# Patient Record
Sex: MALE | Race: WHITE | NOT HISPANIC OR LATINO | Employment: OTHER | URBAN - METROPOLITAN AREA
[De-identification: names, ages, dates, MRNs, and addresses within clinical notes are randomized per-mention and may not be internally consistent; named-entity substitution may affect disease eponyms.]

---

## 2020-01-07 ENCOUNTER — TELEPHONE (OUTPATIENT)
Dept: SURGERY | Facility: CLINIC | Age: 65
End: 2020-01-07

## 2020-01-07 NOTE — TELEPHONE ENCOUNTER
----- Message from Pushpa Chaudhary RN sent at 1/6/2020  2:18 PM CST -----  Contact: Jeanna ([facility]): [phone]  Did you receive the disc?    Pushpa  ----- Message -----  From: Олег Zhao  Sent: 1/6/2020   2:07 PM CST  To: Kamar [facility] Staff    State the pt's disc were sent over on 1/2 and the office should have gotten them on the next day       Please contact Jeanna ([facility]): [phone]

## 2020-01-08 ENCOUNTER — TELEPHONE (OUTPATIENT)
Dept: SURGERY | Facility: CLINIC | Age: 65
End: 2020-01-08

## 2020-01-08 ENCOUNTER — OFFICE VISIT (OUTPATIENT)
Dept: SURGERY | Facility: CLINIC | Age: 65
End: 2020-01-08
Payer: MEDICARE

## 2020-01-08 VITALS
DIASTOLIC BLOOD PRESSURE: 86 MMHG | HEIGHT: 71 IN | SYSTOLIC BLOOD PRESSURE: 148 MMHG | WEIGHT: 217.38 LBS | BODY MASS INDEX: 30.43 KG/M2 | HEART RATE: 80 BPM

## 2020-01-08 DIAGNOSIS — C20 RECTAL CANCER: Primary | ICD-10-CM

## 2020-01-08 PROCEDURE — 3008F PR BODY MASS INDEX (BMI) DOCUMENTED: ICD-10-PCS | Mod: CPTII,S$GLB,, | Performed by: COLON & RECTAL SURGERY

## 2020-01-08 PROCEDURE — 99204 OFFICE O/P NEW MOD 45 MIN: CPT | Mod: S$GLB,,, | Performed by: COLON & RECTAL SURGERY

## 2020-01-08 PROCEDURE — 1101F PT FALLS ASSESS-DOCD LE1/YR: CPT | Mod: CPTII,S$GLB,, | Performed by: COLON & RECTAL SURGERY

## 2020-01-08 PROCEDURE — 99204 PR OFFICE/OUTPT VISIT, NEW, LEVL IV, 45-59 MIN: ICD-10-PCS | Mod: S$GLB,,, | Performed by: COLON & RECTAL SURGERY

## 2020-01-08 PROCEDURE — 99999 PR PBB SHADOW E&M-EST. PATIENT-LVL III: ICD-10-PCS | Mod: PBBFAC,,, | Performed by: COLON & RECTAL SURGERY

## 2020-01-08 PROCEDURE — 3008F BODY MASS INDEX DOCD: CPT | Mod: CPTII,S$GLB,, | Performed by: COLON & RECTAL SURGERY

## 2020-01-08 PROCEDURE — 99999 PR PBB SHADOW E&M-EST. PATIENT-LVL III: CPT | Mod: PBBFAC,,, | Performed by: COLON & RECTAL SURGERY

## 2020-01-08 PROCEDURE — 1101F PR PT FALLS ASSESS DOC 0-1 FALLS W/OUT INJ PAST YR: ICD-10-PCS | Mod: CPTII,S$GLB,, | Performed by: COLON & RECTAL SURGERY

## 2020-01-08 RX ORDER — LISINOPRIL AND HYDROCHLOROTHIAZIDE 12.5; 2 MG/1; MG/1
1 TABLET ORAL DAILY
Status: ON HOLD | COMMUNITY
End: 2020-09-07 | Stop reason: HOSPADM

## 2020-01-08 RX ORDER — ALLOPURINOL 100 MG/1
100 TABLET ORAL 2 TIMES DAILY
COMMUNITY

## 2020-01-08 RX ORDER — METFORMIN HYDROCHLORIDE 500 MG/1
500 TABLET ORAL 2 TIMES DAILY WITH MEALS
COMMUNITY

## 2020-01-08 NOTE — LETTER
January 23, 2020      JANNETTE Viramontes MD  [address]  [place] [address]           [facility]  [address]  [facility] [address]  Phone: [phone]          Patient: Franky Peraza   MR Number: [id number]   YOB: 1955   Date of Visit: 1/8/2020       Dear Dr. JANNETTE Viramontes:    Thank you for referring Franky Peraza to me for evaluation. Attached you will find relevant portions of my assessment and plan of care.    If you have questions, please do not hesitate to call me. I look forward to following Franky Peraza along with you.    Sincerely,    ENMANUEL Galvin MD    Enclosure  CC:  No Recipients    If you would like to receive this communication electronically, please contact [email or web address] or [phone] to request more information on [employer] Link access.    For providers and/or their staff who would like to refer a patient to [facility], please contact us through our one-stop-shop provider referral line, [facility], at [phone].    If you feel you have received this communication in error or would no longer like to receive these types of communications, please e-mail [email or web address]

## 2020-01-08 NOTE — TELEPHONE ENCOUNTER
Called [facility] in [place] about overnight fedexing slides. Sent the FRANCK form and gave her our Fed Ex # to [place].

## 2020-01-08 NOTE — PROGRESS NOTES
[facility]  Colorectal Surgery  Progress Note        Subjective:     Mr. Peraza is a 64 y.o. male from [place], [place] who presents to the clinic with newly diagnosed rectal cancer. He reports that about 6 months ago, he started to have some bleeding per rectum associated with bowel movements. At the time, he was hoping this was due to hemorrhoids although he has not had hemorrhoids in the past. Additionally at that time, he was unable to get a coloscopy as he did not have insurance. Once he turned 64, he was referred to GI for rectal bleeding and underwent colonoscopy on 12/2/2019 which showed a low rectal mass with biopsy returning as moderately differentiated rectal adenocarcinoma. He had an MRI w/ and w/o contrast performed 12/26/2019 which did not show any definitive evidence of extension of the tumor through the muscular wall, but did show two lymph nodes in the mesial rectal fat. PET completed 12/26/2019 did not show any evidence of metastatic disease. He currently reports only rectal bleeding. No rectal pain, no changes in stool caliber, frequency, or quality. He has 1-2 non-painful, soft, well formed bowel movements daily.     He has since seen a hematologist/oncologist Dr. Krystle Staples and a radiation oncologist Dr. Viramontes. He has also seen Dr. Monty Dowd, a general surgeon, who recommended APR. However, due to distal nature of the tumor, did not feel that he would be a candidate for colostomy reversal. Mr. Peraza wanted to a second opinion from a colorectal surgeon regarding surgical options and so presented here.     He is in otherwise good health and works on a farm with machinery. He has a history of gout, diabetes and hypertension which are all well controlled with medication. No history of abdominal surgeries.     Last colonoscopy: 12/2/2019 - Rectal mass with pathology of rectal carcinoma  Family history of CRC: two maternal uncles with rectal cancer diagnosed in 60 and  70s.  Family history of IBD: denies     ROS:  Gen: no fevers, no chills, +intentional 20 lb weight loss over last 6 months  CV: no palpitations, no peripheral edema  Respit: no cough, no SOB  Abd: no abd pain, no nausea, no vomiting. +rectal bleeding.   Skin: no rash, no wound  MSK: no back pain, no myalgias, +arthralgias associated with arthritis  Heme: no lymphadenopathy  Neuro: no headache, no dizziness      Current Outpatient Medications:     allopurinol (ZYLOPRIM) 100 MG tablet, Take 100 mg by mouth once daily., Disp: , Rfl:     lisinopril-hydrochlorothiazide (PRINZIDE,ZESTORETIC) 20-12.5 mg per tablet, Take 1 tablet by mouth once daily., Disp: , Rfl:     metFORMIN (GLUCOPHAGE) 500 MG tablet, Take 500 mg by mouth 2 (two) times daily with meals., Disp: , Rfl:        Objective:      Vitals:   Vitals:    01/08/20 0843   BP: (!) 148/86   Pulse: 80     Physical Exam:  Gen: well appearing, no acute distress  CV: RRR  Respit: Breathing non-labored  Abd: Soft, non-tender, non-distended  Rectal: ADELAIDA with palpable mass approximately 4 cm from anal verge. Mostly left lateral and extending anterior and encompasses about 40% of the circumference. Blood noted in anal canal. Flex sig evaluating the rectum was completed demonstrating a bleeding distal rectal mass extending down to the dentate line.  Extr: Warm and well perfused  MSK: moves all extremities appropriately  Neuro: alert, CN II - XII grossly intact     Significant Diagnostics:  OSH imaging and labs reviewed:    12/26/2019 MRI Pelvis w/ and w/o contrast  - Eccentric wall thickening in rectum predominantly anteriorly along left later aspect with enhancement likely representing described neoplasm. No definitive evidence of extension through wall. 2 nodes in mesial rectal fat with largest 7-8 mm.    12/26/2019 PET CT   - No evidence of metastatic disease    12/24/2019 Labs  - H/H 14.1/41.2  - CEA 27.3       Assessment/Plan:      Mr. Peraza is a 63 yo M with locally  advanced low rectal cancer with excellent functional status, highly motivated for sphincter preserving surgery.  We discussed that given the very distal location of the tumor into the anal canal, a sphincter preserving surgery at this point in time would likely not be technically feasible.  However, I discussed with him the potential for sphincter preserving surgery after neoadjuvant therapy.   I would favor total neoadjuvant therapy utilizing long course chemoradiotherapy followed by 8 cycles of chemotherapy.  I would perform restaging flex sig and MRI 1 month following completion of CXRT and as long as no progression would proceed with full chemotherapy (FOLFOX) 8 cycles then reassessment and either LAR Intersphincteric resection with colonic J pouch anal anastomosis versus APR.       This plan will be relayed to Dr. Viramontes and Dr. Staples  Will have the patient follow up in 3 months for re-staging       Odin TSE MD, FACS, FASCRS  Staff Surgeon  Dept of Colon and Rectal Surgery  [facility] [place], [place]    Procedure note    Flexible sigmoidoscopy    Verbal consent obtained.     Indications:  Rectal cancer    Post procedure diagnosis:  Same, low    Procedure:  Flexible sigmoidoscopy    Surgeon KASHIF    Asst:  Yvonne    Findings:    Tumor below the anorectal ring just above the dentate line                        Technique in detail:  Timeout performed.  Pt placed in left lateral Hardin position.  Lubrication with digital rectal exam revealed nl tone,  palpable masses.  The endoscope was lubricated and the tip inserted into the anal canal.  The endoscope was advanced under direct vision to 35  cm.  Upon withdrawal the mucosa was meticulously inspected.  The pt tolerated the procedure well     Complications:  None    EBL:  None    Patient discharged from the office in stable condition

## 2020-01-09 ENCOUNTER — TELEPHONE (OUTPATIENT)
Dept: SURGERY | Facility: CLINIC | Age: 65
End: 2020-01-09

## 2020-01-09 NOTE — TELEPHONE ENCOUNTER
Spoke with Clarisse at [facility], [place], visit note from 1/8 was faxed, stated it has been recieved

## 2020-01-09 NOTE — TELEPHONE ENCOUNTER
----- Message from Colleen Burkett sent at 1/9/2020  8:21 AM CST -----  Contact: [facility]  Reason: Calling to get visit notes form yesterday's visit. Pt is being seen @ 1:00 today and notes is needed for visit. Thanks    Communication: tel [phone] fax [phone]

## 2020-01-16 ENCOUNTER — TELEPHONE (OUTPATIENT)
Dept: SURGERY | Facility: CLINIC | Age: 65
End: 2020-01-16

## 2020-01-16 NOTE — TELEPHONE ENCOUNTER
Spoke with patient. States he spoke with Dr. Santillan this afternoon and a defined plan of care was established. He is to have a port implanted on Monday 1/20 and start chemotherapy Tuesday 1/21.

## 2020-01-16 NOTE — TELEPHONE ENCOUNTER
----- Message from ENMANUEL Galvin MD sent at 1/16/2020  4:43 PM CST -----  Contact: pt: [phone]  I spoke with Dr Staples.  Can you follow up with pt to make sure she has contacted him?   Thanks.  Call me if she has not.  DV    [phone]  ----- Message -----  From: Krystle Walsh RN  Sent: 1/14/2020   4:52 PM CST  To: ENMANUEL Galvin MD        ----- Message -----  From: Олег Zhao  Sent: 1/14/2020   2:06 PM CST  To: [facility] Staff    Pt called to speak with nurse, state his chemo doctor Dr. Krystle Staples is still waiting to hear from Dr. Galvin so that he can start his treatment, state he has not heard anything from anyone re the matter     Please contact pt: [phone]      Can contact Dr. Staples office at [phone]

## 2020-01-22 PROBLEM — C20 RECTAL CANCER: Status: ACTIVE | Noted: 2020-01-22

## 2020-01-23 ENCOUNTER — DOCUMENTATION ONLY (OUTPATIENT)
Dept: SURGERY | Facility: HOSPITAL | Age: 65
End: 2020-01-23

## 2020-01-23 NOTE — PROGRESS NOTES
Multidisciplinary Rectal Cancer Conference - Evaluation and Recommendation Summary    1/22/2020  Franky Peraza  [id number]  64 y.o. male    1. Evaluation    63 yo M     6 months of BRBPR    Colonoscopy 12/2/19: Low rectal mass     Path: moderately differentiated  Adenocarcinoma.         MRI date: 12/26/2020    4-5 cm from anal verge. No obvious extension through muslce wall. Two 7 mm suspicious LN. No sphincter involvement.     Tumor Location in Rectum: lower third    Indication of Sphincter Involvement:  Uninvolved    Pretreatment Circumferential Resection Margin (CRM) status:  Not Threatened    Pretreatment (clinical) AJCC Stage: IIIA  Stage I   [] I: T1N0M0  [] I: T2N0M0  Stage II  [] IIA: T3N0M0  [] IIB T8zI7O9  [] IIC: M5sR1G1  Stage III  [x] IIIA: T1-2N1M0  [] IIIA: P1B2tI2  [] IIIB: T3-R6dX5Q3  [] IIIB: T2-3N2aM0  [] IIIB: T1-2N2bM0  [] IIIC: S0lI1gQ7  [] IIIC: T3-3yI0hG2  [] IIIC: N5bM8-4U8   Stage IV  [] IV: J1-1R3-4R1k-b    CEA level: 27.3    2. Treatment Recommendation    Neoadjuvant Therapy Recommendation:     Long Chemoradiation + Chemotherapy (TIFFANI) and likely APR after.     Anticipated date and type of surgical procedure: - Abdominoperineal Resection (APR)     Clinical research study eligibility and/or enrollment: Not eligible

## 2020-03-31 ENCOUNTER — TELEPHONE (OUTPATIENT)
Dept: SURGERY | Facility: CLINIC | Age: 65
End: 2020-03-31

## 2020-03-31 NOTE — TELEPHONE ENCOUNTER
Spoke to pt informing him that his OV is being postponed for 3 mo's per Dr. Galvin, due to COVID19 concern. Pt voiced understanding.

## 2020-07-08 ENCOUNTER — OFFICE VISIT (OUTPATIENT)
Dept: SURGERY | Facility: CLINIC | Age: 65
End: 2020-07-08
Payer: MEDICARE

## 2020-07-08 VITALS
BODY MASS INDEX: 31.17 KG/M2 | WEIGHT: 222.63 LBS | HEART RATE: 80 BPM | HEIGHT: 71 IN | SYSTOLIC BLOOD PRESSURE: 120 MMHG | DIASTOLIC BLOOD PRESSURE: 80 MMHG

## 2020-07-08 DIAGNOSIS — C20 RECTAL CANCER: Primary | ICD-10-CM

## 2020-07-08 PROCEDURE — 1101F PR PT FALLS ASSESS DOC 0-1 FALLS W/OUT INJ PAST YR: ICD-10-PCS | Mod: CPTII,S$GLB,, | Performed by: COLON & RECTAL SURGERY

## 2020-07-08 PROCEDURE — 99999 PR PBB SHADOW E&M-EST. PATIENT-LVL III: CPT | Mod: PBBFAC,,, | Performed by: COLON & RECTAL SURGERY

## 2020-07-08 PROCEDURE — 3008F BODY MASS INDEX DOCD: CPT | Mod: CPTII,S$GLB,, | Performed by: COLON & RECTAL SURGERY

## 2020-07-08 PROCEDURE — 1101F PT FALLS ASSESS-DOCD LE1/YR: CPT | Mod: CPTII,S$GLB,, | Performed by: COLON & RECTAL SURGERY

## 2020-07-08 PROCEDURE — 99213 PR OFFICE/OUTPT VISIT, EST, LEVL III, 20-29 MIN: ICD-10-PCS | Mod: S$GLB,,, | Performed by: COLON & RECTAL SURGERY

## 2020-07-08 PROCEDURE — 3008F PR BODY MASS INDEX (BMI) DOCUMENTED: ICD-10-PCS | Mod: CPTII,S$GLB,, | Performed by: COLON & RECTAL SURGERY

## 2020-07-08 PROCEDURE — 99999 PR PBB SHADOW E&M-EST. PATIENT-LVL III: ICD-10-PCS | Mod: PBBFAC,,, | Performed by: COLON & RECTAL SURGERY

## 2020-07-08 PROCEDURE — 99213 OFFICE O/P EST LOW 20 MIN: CPT | Mod: S$GLB,,, | Performed by: COLON & RECTAL SURGERY

## 2020-07-08 NOTE — PROGRESS NOTES
HPI:  Franky Peraza is a 64 y.o. male with history of rectal cancer.  He has 2 more cycles of chemotherapy to completed TIFFANI.  He feels well.  He denies rectal bleeding.  He is moving his bowels well.      Multidisciplinary Rectal Cancer Conference - Evaluation and Recommendation Summary     1/22/2020  Franky Peraza  [id number]  64 y.o. male     1. Evaluation     63 yo M      6 months of BRBPR     Colonoscopy 12/2/19: Low rectal mass      Path: moderately differentiated  Adenocarcinoma.            MRI date: 12/26/2020     4-5 cm from anal verge. No obvious extension through muslce wall. Two 7 mm suspicious LN. No sphincter involvement.      Tumor Location in Rectum: lower third     Indication of Sphincter Involvement:  Uninvolved     Pretreatment Circumferential Resection Margin (CRM) status:  Not Threatened     Pretreatment (clinical) AJCC Stage: IIIA  Stage I   []? I: T1N0M0  []? I: T2N0M0  Stage II  []? IIA: T3N0M0  []? IIB A6eE7W0  []? IIC: B1lR5H0  Stage III  [x]? IIIA: T1-2N1M0  []? IIIA: R0O1hG0  []? IIIB: T3-I1hE6T7  []? IIIB: T2-3N2aM0  []? IIIB: T1-2N2bM0  []? IIIC: S1uU1kG6  []? IIIC: T3-1kT1jT5  []? IIIC: V9tX7-5V7   Stage IV  []? IV: D4-5O7-6E2i-b     CEA level: 27.3     2. Treatment Recommendation     Neoadjuvant Therapy Recommendation:      Long Chemoradiation + Chemotherapy (TIFFANI) and likely APR after.      Anticipated date and type of surgical procedure: - Abdominoperineal Resection (APR)      Clinical research study eligibility and/or enrollment: Not eligible         Interval history  2 more cycles of chemo.  Will finish July 28th.  No bleeding.  BMs generally formed.   Appetite and energy levels good.         No past medical history on file.     No past surgical history on file.    Review of patient's allergies indicates:  No Known Allergies    No family history on file.    Social History     Socioeconomic History    Marital status:      Spouse name: Not on file    Number of children: Not on file  "   Years of education: Not on file    Highest education level: Not on file   Occupational History    Not on file   Social Needs    Financial resource strain: Not on file    Food insecurity     Worry: Not on file     Inability: Not on file    Transportation needs     Medical: Not on file     Non-medical: Not on file   Tobacco Use    Smoking status: Never Smoker    Smokeless tobacco: Never Used   Substance and Sexual Activity    Alcohol use: Never     Frequency: Never    Drug use: Never    Sexual activity: Yes   Lifestyle    Physical activity     Days per week: Not on file     Minutes per session: Not on file    Stress: Not on file   Relationships    Social connections     Talks on phone: Not on file     Gets together: Not on file     Attends Rastafari service: Not on file     Active member of club or organization: Not on file     Attends meetings of clubs or organizations: Not on file     Relationship status: Not on file   Other Topics Concern    Not on file   Social History Narrative    Not on file       ROS:  GENERAL: No fever, chills, fatigability or weight loss.  Integument: No rashes, redness, icterus  CHEST: Denies MCGARRY, cyanosis, wheezing, cough and sputum production.  CARDIOVASCULAR: Denies chest pain, PND, orthopnea or reduced exercise tolerance.  GI: Denies abd pain, dysphagia, nausea, vomiting, no hematemesis   : Denies burning on urination, no hematuria, no bacteriuria  MSK: No deformities, swelling, joint pain swelling  Neurologic: No HAs, seizures, weakness, paresthesias, gait problems    PE:  General appearance healthy male in NAD   /80 (BP Location: Right arm, Patient Position: Sitting, BP Method: Large (Automatic))   Pulse 80   Ht 5' 11" (1.803 m)   Wt 101 kg (222 lb 9.6 oz)   BMI 31.05 kg/m²   Sclera/ Skin anicteric  LN none palpable  AT NC EOMI  Neck supple trachea midline   Chest symmetric, nl excursion, no retractions, breathing comfortably  Abdomen  ND soft NT.  No " masses, no organomegaly  EXT - no CCE  Neuro:  Mood/ affect nl, alert and oriented x 3, moves all ext's, gait nl    Assessment:  Complete chemotherapy    Plan:  Flex sig  MRI

## 2020-08-03 ENCOUNTER — TELEPHONE (OUTPATIENT)
Dept: SURGERY | Facility: CLINIC | Age: 65
End: 2020-08-03

## 2020-08-03 NOTE — TELEPHONE ENCOUNTER
----- Message from Nathalie Castellon sent at 8/3/2020  4:16 PM CDT -----  Franky Peraza called stated his authorization for MRI need to be redone.  His insurance stated he need the MRI done on or before 8/7/20   [phone]

## 2020-08-10 ENCOUNTER — HOSPITAL ENCOUNTER (OUTPATIENT)
Dept: RADIOLOGY | Facility: HOSPITAL | Age: 65
Discharge: HOME | End: 2020-08-10
Attending: COLON & RECTAL SURGERY
Payer: MEDICARE

## 2020-08-10 DIAGNOSIS — C20 RECTAL CANCER: ICD-10-CM

## 2020-08-10 PROCEDURE — A9585 GADOBUTROL INJECTION: HCPCS | Performed by: COLON & RECTAL SURGERY

## 2020-08-10 PROCEDURE — 25500020 PHARM REV CODE 255: Performed by: COLON & RECTAL SURGERY

## 2020-08-10 PROCEDURE — 72197 MRI PELVIS W/O & W/DYE: CPT | Mod: 26,,, | Performed by: RADIOLOGY

## 2020-08-10 PROCEDURE — 72197 MRI PELVIS W/O & W/DYE: CPT | Mod: TC

## 2020-08-10 PROCEDURE — 72197 MRI RECTAL CANCER W W/O CONTRAST: ICD-10-PCS | Mod: 26,,, | Performed by: RADIOLOGY

## 2020-08-10 RX ORDER — GADOBUTROL 604.72 MG/ML
10 INJECTION INTRAVENOUS
Status: COMPLETED | OUTPATIENT
Start: 2020-08-10 | End: 2020-08-10

## 2020-08-10 RX ADMIN — GADOBUTROL 10 ML: 604.72 INJECTION INTRAVENOUS at 02:08

## 2020-08-11 ENCOUNTER — OFFICE VISIT (OUTPATIENT)
Dept: SURGERY | Facility: CLINIC | Age: 65
End: 2020-08-11
Payer: MEDICARE

## 2020-08-11 ENCOUNTER — OFFICE VISIT (OUTPATIENT)
Dept: WOUND CARE | Facility: CLINIC | Age: 65
End: 2020-08-11
Payer: MEDICARE

## 2020-08-11 ENCOUNTER — ANESTHESIA (OUTPATIENT)
Dept: ENDOSCOPY | Facility: HOSPITAL | Age: 65
End: 2020-08-11
Payer: MEDICARE

## 2020-08-11 ENCOUNTER — ANESTHESIA EVENT (OUTPATIENT)
Dept: ENDOSCOPY | Facility: HOSPITAL | Age: 65
End: 2020-08-11
Payer: MEDICARE

## 2020-08-11 ENCOUNTER — HOSPITAL ENCOUNTER (OUTPATIENT)
Dept: CARDIOLOGY | Facility: CLINIC | Age: 65
Discharge: HOME | End: 2020-08-11
Payer: MEDICARE

## 2020-08-11 ENCOUNTER — HOSPITAL ENCOUNTER (OUTPATIENT)
Facility: HOSPITAL | Age: 65
Discharge: HOME | End: 2020-08-11
Attending: COLON & RECTAL SURGERY | Admitting: COLON & RECTAL SURGERY
Payer: MEDICARE

## 2020-08-11 VITALS
BODY MASS INDEX: 30.13 KG/M2 | HEART RATE: 83 BPM | WEIGHT: 215.19 LBS | SYSTOLIC BLOOD PRESSURE: 192 MMHG | HEIGHT: 71 IN | DIASTOLIC BLOOD PRESSURE: 110 MMHG

## 2020-08-11 VITALS
WEIGHT: 212 LBS | OXYGEN SATURATION: 100 % | RESPIRATION RATE: 16 BRPM | SYSTOLIC BLOOD PRESSURE: 146 MMHG | HEIGHT: 71 IN | BODY MASS INDEX: 29.68 KG/M2 | HEART RATE: 81 BPM | DIASTOLIC BLOOD PRESSURE: 90 MMHG | TEMPERATURE: 98 F

## 2020-08-11 DIAGNOSIS — C20 RECTAL CANCER: ICD-10-CM

## 2020-08-11 DIAGNOSIS — Z01.818 PRE-OP EVALUATION: ICD-10-CM

## 2020-08-11 DIAGNOSIS — Z43.2 ATTENTION TO ILEOSTOMY: Primary | ICD-10-CM

## 2020-08-11 DIAGNOSIS — C20 RECTAL CANCER: Primary | ICD-10-CM

## 2020-08-11 DIAGNOSIS — Z71.89 ENCOUNTER FOR OSTOMY CARE EDUCATION: ICD-10-CM

## 2020-08-11 DIAGNOSIS — Z01.818 PRE-OP EVALUATION: Primary | ICD-10-CM

## 2020-08-11 LAB
CREAT SERPL-MCNC: 1 MG/DL (ref 0.5–1.4)
POCT GLUCOSE: 119 MG/DL (ref 70–110)
SAMPLE: NORMAL
SARS-COV-2 RDRP RESP QL NAA+PROBE: NEGATIVE

## 2020-08-11 PROCEDURE — E9220 PRA ENDO ANESTHESIA: ICD-10-PCS | Mod: ,,, | Performed by: NURSE ANESTHETIST, CERTIFIED REGISTERED

## 2020-08-11 PROCEDURE — 63600175 PHARM REV CODE 636 W HCPCS: Performed by: NURSE ANESTHETIST, CERTIFIED REGISTERED

## 2020-08-11 PROCEDURE — 99024 PR POST-OP FOLLOW-UP VISIT: ICD-10-PCS | Mod: S$GLB,,, | Performed by: CLINICAL NURSE SPECIALIST

## 2020-08-11 PROCEDURE — 93010 ELECTROCARDIOGRAM REPORT: CPT | Mod: S$GLB,,, | Performed by: INTERNAL MEDICINE

## 2020-08-11 PROCEDURE — 99214 PR OFFICE/OUTPT VISIT, EST, LEVL IV, 30-39 MIN: ICD-10-PCS | Mod: 25,S$GLB,, | Performed by: COLON & RECTAL SURGERY

## 2020-08-11 PROCEDURE — 99999 PR PBB SHADOW E&M-EST. PATIENT-LVL II: CPT | Mod: PBBFAC,,, | Performed by: CLINICAL NURSE SPECIALIST

## 2020-08-11 PROCEDURE — 45330 DIAGNOSTIC SIGMOIDOSCOPY: CPT | Mod: ,,, | Performed by: COLON & RECTAL SURGERY

## 2020-08-11 PROCEDURE — 93005 ELECTROCARDIOGRAM TRACING: CPT | Mod: 59,S$GLB,, | Performed by: COLON & RECTAL SURGERY

## 2020-08-11 PROCEDURE — 3008F PR BODY MASS INDEX (BMI) DOCUMENTED: ICD-10-PCS | Mod: CPTII,S$GLB,, | Performed by: COLON & RECTAL SURGERY

## 2020-08-11 PROCEDURE — E9220 PRA ENDO ANESTHESIA: HCPCS | Mod: ,,, | Performed by: NURSE ANESTHETIST, CERTIFIED REGISTERED

## 2020-08-11 PROCEDURE — U0002 COVID-19 LAB TEST NON-CDC: HCPCS

## 2020-08-11 PROCEDURE — 99999 PR PBB SHADOW E&M-EST. PATIENT-LVL III: CPT | Mod: PBBFAC,,, | Performed by: COLON & RECTAL SURGERY

## 2020-08-11 PROCEDURE — 93010 EKG 12-LEAD: ICD-10-PCS | Mod: S$GLB,,, | Performed by: INTERNAL MEDICINE

## 2020-08-11 PROCEDURE — 37000008 HC ANESTHESIA 1ST 15 MINUTES: Performed by: COLON & RECTAL SURGERY

## 2020-08-11 PROCEDURE — 1101F PT FALLS ASSESS-DOCD LE1/YR: CPT | Mod: CPTII,S$GLB,, | Performed by: COLON & RECTAL SURGERY

## 2020-08-11 PROCEDURE — 25000003 PHARM REV CODE 250: Performed by: COLON & RECTAL SURGERY

## 2020-08-11 PROCEDURE — 3008F BODY MASS INDEX DOCD: CPT | Mod: CPTII,S$GLB,, | Performed by: COLON & RECTAL SURGERY

## 2020-08-11 PROCEDURE — 93005 EKG 12-LEAD: ICD-10-PCS | Mod: 59,S$GLB,, | Performed by: COLON & RECTAL SURGERY

## 2020-08-11 PROCEDURE — 99214 OFFICE O/P EST MOD 30 MIN: CPT | Mod: 25,S$GLB,, | Performed by: COLON & RECTAL SURGERY

## 2020-08-11 PROCEDURE — 99999 PR PBB SHADOW E&M-EST. PATIENT-LVL II: ICD-10-PCS | Mod: PBBFAC,,, | Performed by: CLINICAL NURSE SPECIALIST

## 2020-08-11 PROCEDURE — 99999 PR PBB SHADOW E&M-EST. PATIENT-LVL III: ICD-10-PCS | Mod: PBBFAC,,, | Performed by: COLON & RECTAL SURGERY

## 2020-08-11 PROCEDURE — 37000009 HC ANESTHESIA EA ADD 15 MINS: Performed by: COLON & RECTAL SURGERY

## 2020-08-11 PROCEDURE — 45330 DIAGNOSTIC SIGMOIDOSCOPY: CPT | Performed by: COLON & RECTAL SURGERY

## 2020-08-11 PROCEDURE — 1101F PR PT FALLS ASSESS DOC 0-1 FALLS W/OUT INJ PAST YR: ICD-10-PCS | Mod: CPTII,S$GLB,, | Performed by: COLON & RECTAL SURGERY

## 2020-08-11 PROCEDURE — 45330 PR SIGMOIDOSCOPY,DIAG2STIC: ICD-10-PCS | Mod: ,,, | Performed by: COLON & RECTAL SURGERY

## 2020-08-11 PROCEDURE — 99024 POSTOP FOLLOW-UP VISIT: CPT | Mod: S$GLB,,, | Performed by: CLINICAL NURSE SPECIALIST

## 2020-08-11 RX ORDER — NEOMYCIN SULFATE 500 MG/1
TABLET ORAL
Qty: 6 TABLET | Refills: 0 | Status: ON HOLD | OUTPATIENT
Start: 2020-08-11 | End: 2020-09-07 | Stop reason: HOSPADM

## 2020-08-11 RX ORDER — SODIUM CHLORIDE 9 MG/ML
INJECTION, SOLUTION INTRAVENOUS CONTINUOUS
Status: DISCONTINUED | OUTPATIENT
Start: 2020-08-11 | End: 2020-08-11 | Stop reason: HOSPADM

## 2020-08-11 RX ORDER — PROPOFOL 10 MG/ML
VIAL (ML) INTRAVENOUS
Status: DISCONTINUED | OUTPATIENT
Start: 2020-08-11 | End: 2020-08-11

## 2020-08-11 RX ORDER — PROPOFOL 10 MG/ML
VIAL (ML) INTRAVENOUS CONTINUOUS PRN
Status: DISCONTINUED | OUTPATIENT
Start: 2020-08-11 | End: 2020-08-11

## 2020-08-11 RX ORDER — POLYETHYLENE GLYCOL 3350 17 G/17G
POWDER, FOR SOLUTION ORAL
Qty: 238 G | Refills: 0 | Status: ON HOLD | OUTPATIENT
Start: 2020-08-11 | End: 2020-09-07 | Stop reason: HOSPADM

## 2020-08-11 RX ORDER — METRONIDAZOLE 500 MG/1
TABLET ORAL
Qty: 3 TABLET | Refills: 0 | Status: ON HOLD | OUTPATIENT
Start: 2020-08-11 | End: 2020-09-07 | Stop reason: HOSPADM

## 2020-08-11 RX ORDER — LIDOCAINE HYDROCHLORIDE 20 MG/ML
INJECTION INTRAVENOUS
Status: DISCONTINUED | OUTPATIENT
Start: 2020-08-11 | End: 2020-08-11

## 2020-08-11 RX ADMIN — SODIUM CHLORIDE: 0.9 INJECTION, SOLUTION INTRAVENOUS at 09:08

## 2020-08-11 RX ADMIN — LIDOCAINE HYDROCHLORIDE 100 MG: 20 INJECTION, SOLUTION INTRAVENOUS at 09:08

## 2020-08-11 RX ADMIN — PROPOFOL 70 MG: 10 INJECTION, EMULSION INTRAVENOUS at 09:08

## 2020-08-11 RX ADMIN — PROPOFOL 200 MCG/KG/MIN: 10 INJECTION, EMULSION INTRAVENOUS at 09:08

## 2020-08-11 NOTE — ANESTHESIA POSTPROCEDURE EVALUATION
Anesthesia Post Evaluation    Patient: Franky Peraza    Procedure(s) Performed: Procedure(s) (LRB):  SIGMOIDOSCOPY, FLEXIBLE (N/A)    Final Anesthesia Type: general    Patient location during evaluation: GI PACU  Patient participation: Yes- Able to Participate  Level of consciousness: awake and alert and oriented  Post-procedure vital signs: reviewed and stable  Pain management: adequate  Airway patency: patent    PONV status at discharge: No PONV  Anesthetic complications: no      Cardiovascular status: blood pressure returned to baseline and hemodynamically stable  Respiratory status: unassisted, spontaneous ventilation and room air  Hydration status: euvolemic  Follow-up not needed.          Vitals Value Taken Time   /90 08/11/20 1014   Temp 36.4 °C (97.5 °F) 08/11/20 0944   Pulse 81 08/11/20 1014   Resp 16 08/11/20 1014   SpO2 100 % 08/11/20 1014         Event Time   Out of Recovery 10:29:37         Pain/Donnell Score: Donnell Score: 10 (8/11/2020 10:06 AM)

## 2020-08-11 NOTE — PROGRESS NOTES
Subjective:   Franky Peraza is a 64 y.o. male with history of rectal cancer.    He underwent colonoscopy on 12/2/2019 which showed a low rectal mass with biopsy returning as moderately differentiated rectal adenocarcinoma. He had an MRI w/ and w/o contrast performed 12/26/2019 which did not show any definitive evidence of extension of the tumor through the muscular wall, but did show two lymph nodes in the mesial rectal fat. PET completed 12/26/2019 did not show any evidence of metastatic disease.    MRI Results 12/26/19:  4-5 cm from anal verge. No obvious extension through muslce wall. Two 7 mm suspicious LN. No sphincter involvement.      Tumor Location in Rectum: lower third     Indication of Sphincter Involvement:  Uninvolved     Pretreatment Circumferential Resection Margin (CRM) status:  Not Threatened     Pretreatment (clinical) AJCC Stage: IIIA  Stage I   []?? I: T1N0M0  []?? I: T2N0M0  Stage II  []?? IIA: T3N0M0  []?? IIB X9iS5S9  []?? IIC: D2xS4D6  Stage III  [x]?? IIIA: T1-2N1M0  []?? IIIA: E6C2uN0  []?? IIIB: T3-K4pQ1D1  []?? IIIB: T2-3N2aM0  []?? IIIB: T1-2N2bM0  []?? IIIC: N2jE8uZ9  []?? IIIC: T3-7vY3cC0  []?? IIIC: J6qJ5-1G9   Stage IV  []?? IV: B8-0L2-7L5u-b     CEA level: 27.3      He was discussed at multidisciplinary conference and underwent total neoadjuvant therapy, which he completed 3 weeks ago. He reports feeling well, denies any fatigue. Reports intermittent BRBPR with wiping. Denies any constipation or diarrhea. No recent weight loss and reports good appetite.       Post-treatment MRI 8/11/20  TREATED TUMOR/TUMOR BED CHARACTERISTICS:  The primary tumor and extramural disease shows dense low signal intensity fibrotic scar with persistent scattered focal intermediate T2 signal and diffusion restriction, the majority of which is likely fibrotic.  Small component of residual tumor is not excluded given diffusion restriction (for example axial series 9001, image 23 and 19).  Distance of inferior  margin of treated tumor/treated area to the anal verge: 3 cm, noting the inferior margin of the tumor is not well visualized when compared to prior exam.  Relationship to anterior peritoneal reflection: Below  Craniocaudal length: 3.7 cm     Previous craioncaudal length: 4-5 cm  Maximal width: 2.7 cm      Previous width: 3.5   DWI - restricted diffusion in tumor or tumor bed: Present/scattered focal possibly residual tumor.  The majority of the tumor site, however, is likely fibrotic.  EMVI:  No  CRM (for T3 only)  Shortest distance of tumor to MRF (or anticipated CRM): 7 mm (location)  There is likely an area of extension through the muscularis propria at the anterolateral position (axial series 6001, image 27).  No involvement of the levator or anal sphincter.  Mesorectal/superior rectal lymph nodes and/or tumor deposits:Most of the previously noted concerning lymph nodes are significantly decreased in size on today's exam.  There is a residual superiorly located lymph node located less than 1 mm from the mesorectal fascia along the left posterior aspect and measures 4 mm (axial series 6001, image 42).  Additional superiorly located lymph node with rounded morphology measures 6 mm (oblique axial series 45204, image 5).  This lymph node is located 4 mm from the mesorectal fascia.  Extra mesorectal lymph nodes: Yes.  8 mm extra mesorectal lymph node along the left obturator chain (axial series 5001, image 31).  This was present on prior exam and unchanged today.      He underwent flexible sigmoidoscopy today showing a persistent rectal mass 2 to 3 cm from the anal verge.               Patient Active Problem List    Diagnosis Date Noted    Rectal cancer 01/22/2020     Past Medical History:   Diagnosis Date    Diabetes mellitus       Past Surgical History:   Procedure Laterality Date    TONSILLECTOMY        (Not in a hospital admission)    Review of patient's allergies indicates:  No Known Allergies   Social  "History     Tobacco Use    Smoking status: Never Smoker    Smokeless tobacco: Never Used   Substance Use Topics    Alcohol use: Never     Frequency: Never      Family History   Problem Relation Age of Onset    Cancer Mother     Cancer Maternal Uncle         Review of Systems  Pertinent items are noted in HPI.        Objective:      BP (!) 192/110 (BP Location: Right arm, Patient Position: Sitting, BP Method: Large (Automatic))   Pulse 83   Ht 5' 11" (1.803 m)   Wt 97.6 kg (215 lb 2.7 oz)   BMI 30.01 kg/m²   Physical Exam  Constitutional:       Appearance: Normal appearance.   HENT:      Head: Normocephalic.   Cardiovascular:      Rate and Rhythm: Normal rate and regular rhythm.   Pulmonary:      Effort: Pulmonary effort is normal.   Abdominal:      General: Abdomen is flat. There is no distension.      Tenderness: There is no abdominal tenderness.   Skin:     General: Skin is warm and dry.      Capillary Refill: Capillary refill takes less than 2 seconds.   Neurological:      General: No focal deficit present.      Mental Status: He is alert.               Lab Review  Repeat CEA pending      Assessment:     64 year old man with rectal cancer s/p total neoadjuvant therapy with good response to treatment but residual disease.     Plan:      Plan for APR vs LAR. He is going to think about his options further as he is opposed to having an ostomy, however he is understanding that without surgery, he has no chance of cure and will die of his disease given residual tumor presence. He also understands the risks associated with either procedure.  Repeat CEA pending  We will schedule him for surgery and he will contact us regarding his desire to pursue surgery.    Elder Meza MD  Colon and Rectal Surgery Fellow      I have personally obtained a history and performed a physical exam with and independent to my resident and discussed the findings and plan with the patient.  I agree with the above findings and " plan with the following exceptions:  None    The lesion is low, anterior and very close to the anorectal ring.  I had a long discussion with the patient and his wife regarding the down staging which has occurred which is favorable but that surgical excision is still necessary given his prior history of theresa positive disease and the risk of recurrence.  He understands that sphincter preservation may or may not be possible and that if it is possible significant functional challenges related to low anastomosis could affect the quality of his life.  He is highly motivated to avoid a permanent stoma.  I believe this may be technically possible but of course could not give him 100% assurance that this would be accomplished.  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  Additional risks of bleeding, infection, need for reoperation were discussed.  Alternatives for surgery were discussed but given locally advanced disease I do not believe that local excision is reasonable given the high risk of local recurrence and given the persistence of disease he is not a candidate for watch and wait.      Odin TSE MD, FACS, FASCRS  Staff Surgeon  Dept of Colon and Rectal Surgery  [facility] [place], [place]

## 2020-08-11 NOTE — [employer] PATIENT INSTRUCTIONS
Discharge Summary/Instructions after an Endoscopic Procedure  Patient Name: Franky Peraza  Patient MRN: [id number]  Patient YOB: 1955  Tuesday, August 11, 2020  Odin Galvin MD  RESTRICTIONS:  During your procedure today, you received medications for sedation.  These   medications may affect your judgment, balance and coordination.  Therefore,   for 24 hours, you have the following restrictions:   - DO NOT drive a car, operate machinery, make legal/financial decisions,   sign important papers or drink alcohol.    ACTIVITY:  Today: no heavy lifting, straining or running due to procedural   sedation/anesthesia.  The following day: return to full activity including work.  DIET:  Eat and drink normally unless instructed otherwise.     TREATMENT FOR COMMON SIDE EFFECTS:  - Mild abdominal pain, nausea, belching, bloating or excessive gas:  rest,   eat lightly and use a heating pad.  - Sore Throat: treat with throat lozenges and/or gargle with warm salt   water.  - Because air was used during the procedure, expelling large amounts of air   from your rectum or belching is normal.  - If a bowel prep was taken, you may not have a bowel movement for 1-3 days.    This is normal.  SYMPTOMS TO WATCH FOR AND REPORT TO YOUR PHYSICIAN:  1. Abdominal pain or bloating, other than gas cramps.  2. Chest pain.  3. Back pain.  4. Signs of infection such as: chills or fever occurring within 24 hours   after the procedure.  5. Rectal bleeding, which would show as bright red, maroon, or black stools.   (A tablespoon of blood from the rectum is not serious, especially if   hemorrhoids are present.)  6. Vomiting.  7. Weakness or dizziness.  GO DIRECTLY TO THE NEAREST EMERGENCY ROOM IF YOU HAVE ANY OF THE FOLLOWING:      Difficulty breathing              Chills and/or fever over 101 F   Persistent vomiting and/or vomiting blood   Severe abdominal pain   Severe chest pain   Black, tarry stools   Bleeding- more than one  tablespoon   Any other symptom or condition that you feel may need urgent attention  Your doctor recommends these additional instructions:  If any biopsies were taken, your doctors clinic will contact you in 1 to 2   weeks with any results.  - Discharge patient to home (ambulatory).   - Patient has a contact number available for emergencies.  The signs and   symptoms of potential delayed complications were discussed with the   patient.  Return to normal activities tomorrow.  Written discharge   instructions were provided to the patient.   - Resume previous diet.   - Continue present medications.   - Return to my office as previously scheduled.  For questions, problems or results please call your physician - Odin Galvin MD at Work:  [phone].  [facility], EMERGENCY ROOM PHONE NUMBER: [phone]  IF A COMPLICATION OR EMERGENCY SITUATION ARISES AND YOU ARE UNABLE TO REACH   YOUR PHYSICIAN - GO DIRECTLY TO THE EMERGENCY ROOM.  Odin Galvin MD  8/11/2020 9:44:51 AM  This report has been verified and signed electronically.  [employer]

## 2020-08-11 NOTE — ANESTHESIA PREPROCEDURE EVALUATION
08/11/2020  Franky Peraza is a 64 y.o., male.    Patient Active Problem List   Diagnosis    Rectal cancer       Past Medical History:   Diagnosis Date    Diabetes mellitus        Past Surgical History:   Procedure Laterality Date    TONSILLECTOMY             Anesthesia Evaluation    I have reviewed the Patient Summary Reports.   I have reviewed the NPO Status.   I have reviewed the Medications.     Review of Systems  Anesthesia Hx:  No problems with previous Anesthesia  Denies Family Hx of Anesthesia complications.   Denies Personal Hx of Anesthesia complications.   Hematology/Oncology:  Hematology Normal      Current/Recent Cancer. Oncology:   Oncology Comments: Rectal CA     EENT/Dental:EENT/Dental Normal   Cardiovascular:  Cardiovascular Normal     Pulmonary:  Pulmonary Normal    Renal/:  Renal/ Normal     Hepatic/GI:  Hepatic/GI Normal    Musculoskeletal:  Musculoskeletal Normal    Neurological:  Neurology Normal    Endocrine:   Diabetes    Dermatological:  Skin Normal    Psych:  Psychiatric Normal           Physical Exam  General:  Well nourished    Airway/Jaw/Neck:  Airway Findings: General Airway Assessment: Adult Jaw/Neck Findings:     Eyes/Ears/Nose:  EYES/EARS/NOSE FINDINGS: Normal   Dental:  Dental Findings: Upper Dentures, Lower partial dentures   Chest/Lungs:  Chest/Lungs Findings: Clear to auscultation     Heart/Vascular:  Heart Findings: Rate: Normal  Heart murmur: negative Vascular Findings: Normal    Abdomen:  Abdomen Findings: Normal    Musculoskeletal:  Musculoskeletal Findings: Normal   Skin:  Skin Findings: Normal    Mental Status:  Mental Status Findings:  Alert and Oriented, Cooperative         Anesthesia Plan  Type of Anesthesia, risks & benefits discussed:  Anesthesia Type:  general  Patient's Preference: general  Intra-op Monitoring Plan: standard ASA monitors  Intra-op  Monitoring Plan Comments:   Post Op Pain Control Plan:   Post Op Pain Control Plan Comments:   Induction:   IV  Beta Blocker:  Patient is not currently on a Beta-Blocker (No further documentation required).       Informed Consent: Patient understands risks and agrees with Anesthesia plan.  Questions answered. Anesthesia consent signed with patient.  ASA Score: 2     Day of Surgery Review of History & Physical:  There are no significant changes.  H&P update referred to the provider.         Ready For Surgery From Anesthesia Perspective.

## 2020-08-11 NOTE — H&P
COLONOSCOPY HISTORY AND PHYSICAL EXAM    Procedure : Colonoscopy      INDICATIONS: 64 year old man with history of rectal adenocarcinoma stage IIIA, completed total neoadjuvant therapy on 2/28/20 and presents for post-treatment flexible sigmoidoscopy.        Past Medical History:   Diagnosis Date    Diabetes mellitus      Sedation Problems: NO  Family History   Problem Relation Age of Onset    Cancer Mother     Cancer Maternal Uncle      Fam Hx of Sedation Problems: NO  Social History     Socioeconomic History    Marital status:      Spouse name: Not on file    Number of children: Not on file    Years of education: Not on file    Highest education level: Not on file   Occupational History    Not on file   Social Needs    Financial resource strain: Not on file    Food insecurity     Worry: Not on file     Inability: Not on file    Transportation needs     Medical: Not on file     Non-medical: Not on file   Tobacco Use    Smoking status: Never Smoker    Smokeless tobacco: Never Used   Substance and Sexual Activity    Alcohol use: Never     Frequency: Never    Drug use: Never    Sexual activity: Yes   Lifestyle    Physical activity     Days per week: Not on file     Minutes per session: Not on file    Stress: Not on file   Relationships    Social connections     Talks on phone: Not on file     Gets together: Not on file     Attends Christianity service: Not on file     Active member of club or organization: Not on file     Attends meetings of clubs or organizations: Not on file     Relationship status: Not on file   Other Topics Concern    Not on file   Social History Narrative    Not on file       Review of Systems - Negative except   Respiratory ROS: no dyspnea  Cardiovascular ROS: no exertional chest pain  Gastrointestinal ROS: NO abdominal discomfort,  NO rectal bleeding  Musculoskeletal ROS: no muscular pain  Neurological ROS: no recent stroke    Physical Exam:  BP (!) 152/93 (BP Location:  "Left arm, Patient Position: Lying)   Pulse 84   Temp 98.2 °F (36.8 °C) (Temporal)   Resp 14   Ht 5' 11" (1.803 m)   Wt 96.2 kg (212 lb)   SpO2 100%   BMI 29.57 kg/m²   General: no distress  Head: normocephalic  Mallampati Score   Neck: supple, symmetrical, trachea midline  Lungs:  normal respiratory effort  Heart: regular rate and rhythm  Abdomen: soft, non-tender non-distented; bowel sounds normal; no masses,  no organomegaly  Extremities: no cyanosis or edema, or clubbing    ASA:  II    PLAN  COLONOSCOPY.    SedationPlan :MAC    The details of the procedure, the possible need for biopsy or polypectomy and the potential risks including bleeding, perforation, missed polyps were discussed in detail.      "

## 2020-08-11 NOTE — TRANSFER OF CARE
"Anesthesia Transfer of Care Note    Patient: Franky Peraza    Procedure(s) Performed: Procedure(s) (LRB):  SIGMOIDOSCOPY, FLEXIBLE (N/A)    Patient location: OPS    Anesthesia Type: general    Transport from OR: Transported from OR on room air with adequate spontaneous ventilation    Post pain: adequate analgesia    Post assessment: no apparent anesthetic complications and tolerated procedure well    Post vital signs: stable    Level of consciousness: awake, alert and oriented    Nausea/Vomiting: no nausea/vomiting    Complications: none    Transfer of care protocol was followed      Last vitals:   Visit Vitals  BP (!) 152/93 (BP Location: Left arm, Patient Position: Lying)   Pulse 84   Temp 36.8 °C (98.2 °F) (Temporal)   Resp 14   Ht 5' 11" (1.803 m)   Wt 96.2 kg (212 lb)   SpO2 100%   BMI 29.57 kg/m²     "

## 2020-08-11 NOTE — H&P (VIEW-ONLY)
Subjective:   Franky Peraza is a 64 y.o. male with history of rectal cancer.    He underwent colonoscopy on 12/2/2019 which showed a low rectal mass with biopsy returning as moderately differentiated rectal adenocarcinoma. He had an MRI w/ and w/o contrast performed 12/26/2019 which did not show any definitive evidence of extension of the tumor through the muscular wall, but did show two lymph nodes in the mesial rectal fat. PET completed 12/26/2019 did not show any evidence of metastatic disease.    MRI Results 12/26/19:  4-5 cm from anal verge. No obvious extension through muslce wall. Two 7 mm suspicious LN. No sphincter involvement.      Tumor Location in Rectum: lower third     Indication of Sphincter Involvement:  Uninvolved     Pretreatment Circumferential Resection Margin (CRM) status:  Not Threatened     Pretreatment (clinical) AJCC Stage: IIIA  Stage I   []?? I: T1N0M0  []?? I: T2N0M0  Stage II  []?? IIA: T3N0M0  []?? IIB I5oP2L7  []?? IIC: G8tB7G6  Stage III  [x]?? IIIA: T1-2N1M0  []?? IIIA: Q6S4uO7  []?? IIIB: T3-J4lX8M7  []?? IIIB: T2-3N2aM0  []?? IIIB: T1-2N2bM0  []?? IIIC: M0fR9sH9  []?? IIIC: T3-6xD4iF8  []?? IIIC: B1fK6-5I6   Stage IV  []?? IV: I9-5W8-0O6h-b     CEA level: 27.3      He was discussed at multidisciplinary conference and underwent total neoadjuvant therapy, which he completed 3 weeks ago. He reports feeling well, denies any fatigue. Reports intermittent BRBPR with wiping. Denies any constipation or diarrhea. No recent weight loss and reports good appetite.       Post-treatment MRI 8/11/20  TREATED TUMOR/TUMOR BED CHARACTERISTICS:  The primary tumor and extramural disease shows dense low signal intensity fibrotic scar with persistent scattered focal intermediate T2 signal and diffusion restriction, the majority of which is likely fibrotic.  Small component of residual tumor is not excluded given diffusion restriction (for example axial series 9001, image 23 and 19).  Distance of inferior  margin of treated tumor/treated area to the anal verge: 3 cm, noting the inferior margin of the tumor is not well visualized when compared to prior exam.  Relationship to anterior peritoneal reflection: Below  Craniocaudal length: 3.7 cm     Previous craioncaudal length: 4-5 cm  Maximal width: 2.7 cm      Previous width: 3.5   DWI - restricted diffusion in tumor or tumor bed: Present/scattered focal possibly residual tumor.  The majority of the tumor site, however, is likely fibrotic.  EMVI:  No  CRM (for T3 only)  Shortest distance of tumor to MRF (or anticipated CRM): 7 mm (location)  There is likely an area of extension through the muscularis propria at the anterolateral position (axial series 6001, image 27).  No involvement of the levator or anal sphincter.  Mesorectal/superior rectal lymph nodes and/or tumor deposits:Most of the previously noted concerning lymph nodes are significantly decreased in size on today's exam.  There is a residual superiorly located lymph node located less than 1 mm from the mesorectal fascia along the left posterior aspect and measures 4 mm (axial series 6001, image 42).  Additional superiorly located lymph node with rounded morphology measures 6 mm (oblique axial series 40622, image 5).  This lymph node is located 4 mm from the mesorectal fascia.  Extra mesorectal lymph nodes: Yes.  8 mm extra mesorectal lymph node along the left obturator chain (axial series 5001, image 31).  This was present on prior exam and unchanged today.      He underwent flexible sigmoidoscopy today showing a persistent rectal mass 2 to 3 cm from the anal verge.               Patient Active Problem List    Diagnosis Date Noted    Rectal cancer 01/22/2020     Past Medical History:   Diagnosis Date    Diabetes mellitus       Past Surgical History:   Procedure Laterality Date    TONSILLECTOMY        (Not in a hospital admission)    Review of patient's allergies indicates:  No Known Allergies   Social  "History     Tobacco Use    Smoking status: Never Smoker    Smokeless tobacco: Never Used   Substance Use Topics    Alcohol use: Never     Frequency: Never      Family History   Problem Relation Age of Onset    Cancer Mother     Cancer Maternal Uncle         Review of Systems  Pertinent items are noted in HPI.        Objective:      BP (!) 192/110 (BP Location: Right arm, Patient Position: Sitting, BP Method: Large (Automatic))   Pulse 83   Ht 5' 11" (1.803 m)   Wt 97.6 kg (215 lb 2.7 oz)   BMI 30.01 kg/m²   Physical Exam  Constitutional:       Appearance: Normal appearance.   HENT:      Head: Normocephalic.   Cardiovascular:      Rate and Rhythm: Normal rate and regular rhythm.   Pulmonary:      Effort: Pulmonary effort is normal.   Abdominal:      General: Abdomen is flat. There is no distension.      Tenderness: There is no abdominal tenderness.   Skin:     General: Skin is warm and dry.      Capillary Refill: Capillary refill takes less than 2 seconds.   Neurological:      General: No focal deficit present.      Mental Status: He is alert.               Lab Review  Repeat CEA pending      Assessment:     64 year old man with rectal cancer s/p total neoadjuvant therapy with good response to treatment but residual disease.     Plan:      Plan for APR vs LAR. He is going to think about his options further as he is opposed to having an ostomy, however he is understanding that without surgery, he has no chance of cure and will die of his disease given residual tumor presence. He also understands the risks associated with either procedure.  Repeat CEA pending  We will schedule him for surgery and he will contact us regarding his desire to pursue surgery.    Elder Meza MD  Colon and Rectal Surgery Fellow      I have personally obtained a history and performed a physical exam with and independent to my resident and discussed the findings and plan with the patient.  I agree with the above findings and " plan with the following exceptions:  None    The lesion is low, anterior and very close to the anorectal ring.  I had a long discussion with the patient and his wife regarding the down staging which has occurred which is favorable but that surgical excision is still necessary given his prior history of theresa positive disease and the risk of recurrence.  He understands that sphincter preservation may or may not be possible and that if it is possible significant functional challenges related to low anastomosis could affect the quality of his life.  He is highly motivated to avoid a permanent stoma.  I believe this may be technically possible but of course could not give him 100% assurance that this would be accomplished.  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  Additional risks of bleeding, infection, need for reoperation were discussed.  Alternatives for surgery were discussed but given locally advanced disease I do not believe that local excision is reasonable given the high risk of local recurrence and given the persistence of disease he is not a candidate for watch and wait.      Odin TSE MD, FACS, FASCRS  Staff Surgeon  Dept of Colon and Rectal Surgery  [facility] [place], [place]

## 2020-08-11 NOTE — PROGRESS NOTES
Pre op Ostomy marking:    This patient was seen today per request  in preparation for upcoming ostomy surgery on 9/3/20  Stoma marking/siting was performed per protocol and patient marked with a permanent marker and skin staining with silver nitrate.       Pt is very overwhelmed today by the surgery options, he and wife listened as intently as possible,   The proposed surgery was discussed and patient educated on expected postoperative course and expectations. The patient was allowed to ask questions and was also given pre-op information kit from  the [employer] for review at home prior to surgery.

## 2020-08-17 ENCOUNTER — TELEPHONE (OUTPATIENT)
Dept: SURGERY | Facility: CLINIC | Age: 65
End: 2020-08-17

## 2020-08-17 NOTE — TELEPHONE ENCOUNTER
----- Message from Lubna Araiza sent at 8/17/2020 12:03 PM CDT -----  Regarding: Prep  Contact: [phone]  Calling in regards to speaking with nurse Braun in regards to prep not being called in to pharmacy. [facility] does not have prep kit and not sure when it will be in stock.Please call to verify and advise.      [facility]

## 2020-08-18 ENCOUNTER — TELEPHONE (OUTPATIENT)
Dept: SURGERY | Facility: CLINIC | Age: 65
End: 2020-08-18

## 2020-08-18 NOTE — TELEPHONE ENCOUNTER
----- Message from Zuleima Membreno sent at 8/18/2020 11:44 AM CDT -----  Contact: patient wife  Patient wife Nenita Peraza called and had a missed call from your office to please call back at [phone]

## 2020-08-19 ENCOUNTER — DOCUMENTATION ONLY (OUTPATIENT)
Dept: SURGERY | Facility: HOSPITAL | Age: 65
End: 2020-08-19

## 2020-08-19 ENCOUNTER — TELEPHONE (OUTPATIENT)
Dept: SURGERY | Facility: CLINIC | Age: 65
End: 2020-08-19

## 2020-08-19 NOTE — TELEPHONE ENCOUNTER
Told pt usually it is decided once the pathology is back from surgery as to weather or not he will need more chemo. Pt wants Dr Galvin to know that whatever Dr Galvin feels he needs to do during surgery to completely remove the cancer he agrees with.

## 2020-08-19 NOTE — PROGRESS NOTES
Multidisciplinary Rectal Cancer Conference - Evaluation and Recommendation Summary  8/19/2020  Franky Peraza  [id number]  64 y.o. male    64M presented in 7/19 with rectal bleeding.  1. Evaluation    MRI date: 12/26/2019    Tumor Location in Rectum: Lower    Indication of Sphincter Involvement:Uninvolved    Pretreatment Circumferential Resection Margin (CRM) status: Uninvolved    Pretreatment (clinical) AJCC Stage: Stage IIIb  Stage I  [] I: T1N0M0  [] I: T2N0M0  Stage II  [] IIA: T3N0M0  [] IIB X6vM8Y5  [] IIC: V3uP6E8  Stage III  [] IIIA: T1-2N1M0  [] IIIA: Z0Q4vK8  [x] IIIB: T3-W2mP1P1  [] IIIB: T2-3N2aM0  [] IIIB: T1-2N2bM0  [] IIIC: U4fG8mQ8  [] IIIC: T3-5gI7gA4  [] IIIC: N3fS2-1M6   Stage IV  [] IV: C2-6W3-6R6i-b    CEA level:   Lab Results   Component Value Date    CEA 4.5 08/11/2020   Initially 27    2. Treatment    TIFFANI - Long course chemoradiation with FOLFOX + 5940 cGy (1/21/20-3/5/20), then 8 cycles of FOLFOX completed 7/15/20    Restaging MRI 8/10/20 - 3.7x2.7cm tumor, 3cm from anal verge, extension through muscularis propria anterolaterally, no levator or sphincter involvement, two 4mm and 6mm mesorectal LNs, 8mm LN along left obturator chain.    Repeat flex sig with persistent palpable mass anterior 2-3 cm from anal verge.    Surgery: Proper oncologic resection would be an APR. LAR would not be able to achieve an adequate distal cancer for good oncologic outcomes. In discussion, however, patient is adamantly opposed to having an ostomy. Patient requires counseling on the progression of disease as well as ostomy teaching to best understand his options. If patients chooses no operation/palliative options, can follow serially with physical examination.

## 2020-08-19 NOTE — TELEPHONE ENCOUNTER
----- Message from Pushpa Chaudhary RN sent at 8/17/2020 12:19 PM CDT -----  Regarding: FW: Prep  Contact: [phone]  Spoke with patient will email full prep instructions to him.    Pt wants to know if he will have to have chemo after this surgery.  ----- Message -----  From: Lubna Araiza  Sent: 8/17/2020  12:03 PM CDT  To: [facility] Newby Staff  Subject: Prep                                             Calling in regards to speaking with nurse Braun in regards to prep not being called in to pharmacy. [facility] does not have prep kit and not sure when it will be in stock.Please call to verify and advise.      [facility]

## 2020-09-02 ENCOUNTER — ANESTHESIA EVENT (OUTPATIENT)
Dept: SURGERY | Facility: HOSPITAL | Age: 65
DRG: 331 | End: 2020-09-02
Payer: MEDICARE

## 2020-09-02 ENCOUNTER — TELEPHONE (OUTPATIENT)
Dept: SURGERY | Facility: CLINIC | Age: 65
End: 2020-09-02

## 2020-09-02 NOTE — PRE-PROCEDURE INSTRUCTIONS
PREOP INSTRUCTIONS:No solid food ,milk or milk products for 8 hours prior to procedure.Clear liquids are allowed up to 2 hours before procedure.Clear liquids are:water,apple juice,gatorade & powerade.Patient instructed to follow the surgeon's instructions if they differ from these.Shower instructions as well as directions to the Surgery Center were given.Patient encouraged to wear loose fitting,comfortable clothing.Medication instructions for pm prior to and am of procedure reviewed.Instructed patient to avoid taking vitamins,supplements,aspirin and ibuprofen the morning of surgery. Patient stated an understanding.Patient informed of the current visitor policy and advised patient that one visitor may accompany each patient into the hospital and wait (socially distanced) until a member of the medical team provides an update at the conclusion of the procedure.When they enter the hospital both patient and visitor will have their temperature checked.All visitors are asked to arrive with a mask and to keep their mask on throughout the visit.Each inpatient is allowed 1 visitor per day between the hours of 8am and 6pm.This visitor must remain in the patient's room and not gather in common areas such as waiting rooms or cafeterias.The visitor must be 18 years or older and arrive with a mask and keep the mask on throughout the visit.    Patient denies any side effects or issues with anesthesia or sedation.    Patient does not know arrival time.Explained that this information comes from the surgeon's office and if they haven't heard from them by 2 or 3 pm to call the office.Patient stated an understanding.

## 2020-09-02 NOTE — TELEPHONE ENCOUNTER
----- Message from Lubna Araiza sent at 9/2/2020  2:14 PM CDT -----  Regarding: Surgery time and instructions  Contact: Noemí/wife [phone]  Calling to get surgery report time for surgery and any instructions. Please and advise

## 2020-09-02 NOTE — ANESTHESIA PREPROCEDURE EVALUATION
[facility]  Anesthesia Pre-Operative Evaluation         Patient Name: Franky Peraza  YOB: 1955  MRN: [id number]    SUBJECTIVE:     Pre-operative evaluation for Procedure(s) (LRB):  RESECTION, RECTUM, LOW ANTERIOR, LAPAROSCOPIC, WITH SIGMOID COLECTOMY AND LOOP ILEOSTOMY, POSSIBLE ABDOMINOPERINEAL RESECTION (N/A)     09/02/2020    Franky Peraza is a 64 y.o. male w/ a significant PMHx of gout and rectal adenocarcinma stage IIIA completed neoadjuvant therapy on 2/28/20. Colonoscopy on 8/11/20 which showed a palpable mass 2-3 cm from anal verge.    Patient now presents for the above procedure(s).      LDA: None documented.       Prev airway: None documented    Drips: None documented.      Patient Active Problem List   Diagnosis    Rectal cancer       Review of patient's allergies indicates:  No Known Allergies    Current Inpatient Medications:      No current facility-administered medications on file prior to encounter.      Current Outpatient Medications on File Prior to Encounter   Medication Sig Dispense Refill    allopurinol (ZYLOPRIM) 100 MG tablet Take 100 mg by mouth once daily.      lisinopril-hydrochlorothiazide (PRINZIDE,ZESTORETIC) 20-12.5 mg per tablet Take 1 tablet by mouth once daily.      metFORMIN (GLUCOPHAGE) 500 MG tablet Take 500 mg by mouth 2 (two) times daily with meals.      metroNIDAZOLE (FLAGYL) 500 MG tablet Take 1 tablet at 1pm, 2pm, and 11pm the day before surgery. 3 tablet 0    neomycin (MYCIFRADIN) 500 mg Tab Take 2 tablets at 1pm, 2pm, and 11pm the day before surgery. 6 tablet 0    polyethylene glycol (GLYCOLAX) 17 gram/dose powder 3pm - Day before procedure: Drink 8-8 oz glasses of clear liquid with one cap of Miralax in each glass.  (One every 15-20 minutes.) If bowels are not clear - repeat 2-3 more caps until clear. 238 g 0       Past Surgical History:   Procedure Laterality Date    FLEXIBLE SIGMOIDOSCOPY N/A 8/11/2020    Procedure: SIGMOIDOSCOPY,  FLEXIBLE;  Surgeon: ENMANUEL Galvin MD;  Location: [address]);  Service: Endoscopy;  Laterality: N/A;  RAPID - Pt lives 2 hours away - [facility]    TONSILLECTOMY         Social History     Socioeconomic History    Marital status:      Spouse name: Not on file    Number of children: Not on file    Years of education: Not on file    Highest education level: Not on file   Occupational History    Not on file   Social Needs    Financial resource strain: Not on file    Food insecurity     Worry: Not on file     Inability: Not on file    Transportation needs     Medical: Not on file     Non-medical: Not on file   Tobacco Use    Smoking status: Never Smoker    Smokeless tobacco: Never Used   Substance and Sexual Activity    Alcohol use: Never     Frequency: Never    Drug use: Never    Sexual activity: Yes   Lifestyle    Physical activity     Days per week: Not on file     Minutes per session: Not on file    Stress: Not on file   Relationships    Social connections     Talks on phone: Not on file     Gets together: Not on file     Attends Orthodox service: Not on file     Active member of club or organization: Not on file     Attends meetings of clubs or organizations: Not on file     Relationship status: Not on file   Other Topics Concern    Not on file   Social History Narrative    Not on file       OBJECTIVE:     Vital Signs Range (Last 24H):         Significant Labs:  Lab Results   Component Value Date    WBC 4.72 08/11/2020    HGB 12.4 (L) 08/11/2020    HCT 38.6 (L) 08/11/2020     08/11/2020    ALT 33 08/11/2020    AST 34 08/11/2020     08/11/2020    K 4.4 08/11/2020     08/11/2020    CREATININE 1.0 08/11/2020    BUN 16 08/11/2020    CO2 26 08/11/2020       Diagnostic Studies: No relevant studies.    EKG:   Results for orders placed or performed during the hospital encounter of 08/11/20   EKG 12-lead    Collection Time: 08/11/20  4:47 PM    Narrative    Test Reason :  Z01.818,C20,    Vent. Rate : 077 BPM     Atrial Rate : 077 BPM     P-R Int : 140 ms          QRS Dur : 092 ms      QT Int : 368 ms       P-R-T Axes : 027 009 034 degrees     QTc Int : 416 ms    Normal sinus rhythm  Normal ECG  No previous ECGs available  Confirmed by CORY AGARWAL MD ([id number]) on 8/12/2020 9:17:57 AM    Referred By: ENMANUEL ROWLAND           Confirmed By:CORY AGARWAL MD       2D ECHO:  TTE:  No results found for this or any previous visit.    ALAN:  No results found for this or any previous visit.    ASSESSMENT/PLAN:         Anesthesia Evaluation    I have reviewed the Patient Summary Reports.   I have reviewed the NPO Status.   I have reviewed the Medications.     Review of Systems  Anesthesia Hx:  No problems with previous Anesthesia  Denies Family Hx of Anesthesia complications.   Denies Personal Hx of Anesthesia complications.   Hematology/Oncology:  Hematology Normal      Current/Recent Cancer. Oncology:    Oncology Comments: Rectal CA     EENT/Dental:EENT/Dental Normal   Cardiovascular:  Cardiovascular Normal     Pulmonary:  Pulmonary Normal    Renal/:  Renal/ Normal     Hepatic/GI:  Hepatic/GI Normal    Musculoskeletal:  Musculoskeletal Normal    Neurological:  Neurology Normal    Endocrine:   Diabetes    Dermatological:  Skin Normal    Psych:  Psychiatric Normal           Physical Exam  General:  Well nourished    Airway/Jaw/Neck:  Airway Findings: Mouth Opening: Normal Mallampati: II  Improves to I with phonation.  TM Distance: Normal, at least 6 cm  Jaw/Neck Findings:  Neck ROM: Normal ROM      Dental:  Dental Findings: Upper Dentures, Lower partial dentures    Chest/Lungs:  Chest/Lungs Findings: Clear to auscultation     Heart/Vascular:  Heart Findings: Rate: Normal  Rhythm: Regular Rhythm        Mental Status:  Mental Status Findings:  Cooperative, Alert and Oriented         Anesthesia Plan  Type of Anesthesia, risks & benefits discussed:  Anesthesia Type:  general  Patient's Preference:  General  Intra-op Monitoring Plan: standard ASA monitors  Intra-op Monitoring Plan Comments:   Post Op Pain Control Plan: multimodal analgesia  Post Op Pain Control Plan Comments:   Induction:   IV  Beta Blocker:  Patient is not currently on a Beta-Blocker (No further documentation required).       Informed Consent: Patient understands risks and agrees with Anesthesia plan.  Questions answered. Anesthesia consent signed with patient.  ASA Score: 3     Day of Surgery Review of History & Physical:    H&P update referred to the surgeon.     Anesthesia Plan Notes: Discussed plan for general endotracheal anesthesia, pt understands and agrees with plan        Ready For Surgery From Anesthesia Perspective.

## 2020-09-03 ENCOUNTER — ANESTHESIA (OUTPATIENT)
Dept: SURGERY | Facility: HOSPITAL | Age: 65
DRG: 331 | End: 2020-09-03
Payer: MEDICARE

## 2020-09-03 ENCOUNTER — HOSPITAL ENCOUNTER (INPATIENT)
Facility: HOSPITAL | Age: 65
LOS: 4 days | Discharge: HOME | DRG: 331 | End: 2020-09-07
Attending: COLON & RECTAL SURGERY | Admitting: COLON & RECTAL SURGERY
Payer: MEDICARE

## 2020-09-03 DIAGNOSIS — C20 RECTAL CANCER: Primary | ICD-10-CM

## 2020-09-03 LAB
ABO + RH BLD: NORMAL
BLD GP AB SCN CELLS X3 SERPL QL: NORMAL
CREAT SERPL-MCNC: 1.2 MG/DL (ref 0.5–1.4)
EST. GFR  (AFRICAN AMERICAN): >60 ML/MIN/1.73 M^2
EST. GFR  (NON AFRICAN AMERICAN): >60 ML/MIN/1.73 M^2
POCT GLUCOSE: 109 MG/DL (ref 70–110)
POCT GLUCOSE: 165 MG/DL (ref 70–110)
SARS-COV-2 RDRP RESP QL NAA+PROBE: NEGATIVE

## 2020-09-03 PROCEDURE — 88331 PR  PATH CONSULT IN SURG,W FRZ SEC: ICD-10-PCS | Mod: 26,,, | Performed by: PATHOLOGY

## 2020-09-03 PROCEDURE — C1765 ADHESION BARRIER: HCPCS | Performed by: COLON & RECTAL SURGERY

## 2020-09-03 PROCEDURE — 63600175 PHARM REV CODE 636 W HCPCS

## 2020-09-03 PROCEDURE — 25000003 PHARM REV CODE 250: Performed by: STUDENT IN AN ORGANIZED HEALTH CARE EDUCATION/TRAINING PROGRAM

## 2020-09-03 PROCEDURE — 82962 GLUCOSE BLOOD TEST: CPT | Performed by: COLON & RECTAL SURGERY

## 2020-09-03 PROCEDURE — 49905 OMENTAL FLAP INTRA-ABDOM: CPT | Mod: ,,, | Performed by: COLON & RECTAL SURGERY

## 2020-09-03 PROCEDURE — 44139 MOBILIZATION OF COLON: CPT | Mod: ,,, | Performed by: COLON & RECTAL SURGERY

## 2020-09-03 PROCEDURE — 71000033 HC RECOVERY, INTIAL HOUR: Performed by: COLON & RECTAL SURGERY

## 2020-09-03 PROCEDURE — 63600175 PHARM REV CODE 636 W HCPCS: Performed by: STUDENT IN AN ORGANIZED HEALTH CARE EDUCATION/TRAINING PROGRAM

## 2020-09-03 PROCEDURE — D9220A PRA ANESTHESIA: Mod: ,,, | Performed by: ANESTHESIOLOGY

## 2020-09-03 PROCEDURE — S0030 INJECTION, METRONIDAZOLE: HCPCS | Performed by: NURSE PRACTITIONER

## 2020-09-03 PROCEDURE — 37000008 HC ANESTHESIA 1ST 15 MINUTES: Performed by: COLON & RECTAL SURGERY

## 2020-09-03 PROCEDURE — D9220A PRA ANESTHESIA: ICD-10-PCS | Mod: ,,, | Performed by: ANESTHESIOLOGY

## 2020-09-03 PROCEDURE — 94799 UNLISTED PULMONARY SVC/PX: CPT

## 2020-09-03 PROCEDURE — 63600175 PHARM REV CODE 636 W HCPCS: Performed by: NURSE ANESTHETIST, CERTIFIED REGISTERED

## 2020-09-03 PROCEDURE — 88305 TISSUE EXAM BY PATHOLOGIST: CPT | Mod: 26,,, | Performed by: PATHOLOGY

## 2020-09-03 PROCEDURE — 44146 PARTIAL REMOVAL OF COLON: CPT | Mod: ,,, | Performed by: COLON & RECTAL SURGERY

## 2020-09-03 PROCEDURE — U0002 COVID-19 LAB TEST NON-CDC: HCPCS

## 2020-09-03 PROCEDURE — 37000009 HC ANESTHESIA EA ADD 15 MINS: Performed by: COLON & RECTAL SURGERY

## 2020-09-03 PROCEDURE — 25000003 PHARM REV CODE 250: Performed by: NURSE PRACTITIONER

## 2020-09-03 PROCEDURE — 20600001 HC STEP DOWN PRIVATE ROOM

## 2020-09-03 PROCEDURE — 88305 TISSUE EXAM BY PATHOLOGIST: ICD-10-PCS | Mod: 26,,, | Performed by: PATHOLOGY

## 2020-09-03 PROCEDURE — 88309 TISSUE EXAM BY PATHOLOGIST: CPT | Mod: 26,,, | Performed by: PATHOLOGY

## 2020-09-03 PROCEDURE — 88331 PATH CONSLTJ SURG 1 BLK 1SPC: CPT | Mod: 26,,, | Performed by: PATHOLOGY

## 2020-09-03 PROCEDURE — 63600175 PHARM REV CODE 636 W HCPCS: Performed by: NURSE PRACTITIONER

## 2020-09-03 PROCEDURE — 88305 TISSUE EXAM BY PATHOLOGIST: CPT | Mod: 59 | Performed by: PATHOLOGY

## 2020-09-03 PROCEDURE — 82565 ASSAY OF CREATININE: CPT

## 2020-09-03 PROCEDURE — 49905 PR OMENTAL FLAP,INTRA-ABDOMINAL: ICD-10-PCS | Mod: ,,, | Performed by: COLON & RECTAL SURGERY

## 2020-09-03 PROCEDURE — 36000709 HC OR TIME LEV III EA ADD 15 MIN: Performed by: COLON & RECTAL SURGERY

## 2020-09-03 PROCEDURE — 44146 PR PART REMOVAL COLON W COLOPROC,COLOST: ICD-10-PCS | Mod: ,,, | Performed by: COLON & RECTAL SURGERY

## 2020-09-03 PROCEDURE — 88331 PATH CONSLTJ SURG 1 BLK 1SPC: CPT | Performed by: PATHOLOGY

## 2020-09-03 PROCEDURE — 27201423 OPTIME MED/SURG SUP & DEVICES STERILE SUPPLY: Performed by: COLON & RECTAL SURGERY

## 2020-09-03 PROCEDURE — 25000003 PHARM REV CODE 250: Performed by: COLON & RECTAL SURGERY

## 2020-09-03 PROCEDURE — 36000708 HC OR TIME LEV III 1ST 15 MIN: Performed by: COLON & RECTAL SURGERY

## 2020-09-03 PROCEDURE — 99900035 HC TECH TIME PER 15 MIN (STAT)

## 2020-09-03 PROCEDURE — 44139 PR MOBILIZE SPLENIC FLEX: ICD-10-PCS | Mod: ,,, | Performed by: COLON & RECTAL SURGERY

## 2020-09-03 PROCEDURE — 88309 TISSUE EXAM BY PATHOLOGIST: CPT | Performed by: PATHOLOGY

## 2020-09-03 PROCEDURE — 86850 RBC ANTIBODY SCREEN: CPT

## 2020-09-03 PROCEDURE — 71000015 HC POSTOP RECOV 1ST HR: Performed by: COLON & RECTAL SURGERY

## 2020-09-03 PROCEDURE — 71000016 HC POSTOP RECOV ADDL HR: Performed by: COLON & RECTAL SURGERY

## 2020-09-03 PROCEDURE — P9045 ALBUMIN (HUMAN), 5%, 250 ML: HCPCS | Mod: JG | Performed by: STUDENT IN AN ORGANIZED HEALTH CARE EDUCATION/TRAINING PROGRAM

## 2020-09-03 PROCEDURE — 25000003 PHARM REV CODE 250: Performed by: NURSE ANESTHETIST, CERTIFIED REGISTERED

## 2020-09-03 PROCEDURE — 88309 PR  SURG PATH,LEVEL VI: ICD-10-PCS | Mod: 26,,, | Performed by: PATHOLOGY

## 2020-09-03 DEVICE — BARRIER SEPRAFILM ADHESION: Type: IMPLANTABLE DEVICE | Site: ABDOMEN | Status: FUNCTIONAL

## 2020-09-03 RX ORDER — ALVIMOPAN 12 MG/1
12 CAPSULE ORAL 2 TIMES DAILY
Status: DISCONTINUED | OUTPATIENT
Start: 2020-09-03 | End: 2020-09-07

## 2020-09-03 RX ORDER — LIDOCAINE HYDROCHLORIDE 10 MG/ML
1 INJECTION, SOLUTION EPIDURAL; INFILTRATION; INTRACAUDAL; PERINEURAL
Status: COMPLETED | OUTPATIENT
Start: 2020-09-03 | End: 2020-09-03

## 2020-09-03 RX ORDER — SODIUM CHLORIDE 9 MG/ML
INJECTION, SOLUTION INTRAVENOUS CONTINUOUS PRN
Status: DISCONTINUED | OUTPATIENT
Start: 2020-09-03 | End: 2020-09-03

## 2020-09-03 RX ORDER — INSULIN ASPART 100 [IU]/ML
0-5 INJECTION, SOLUTION INTRAVENOUS; SUBCUTANEOUS
Status: DISCONTINUED | OUTPATIENT
Start: 2020-09-03 | End: 2020-09-07 | Stop reason: HOSPADM

## 2020-09-03 RX ORDER — MUPIROCIN 20 MG/G
1 OINTMENT TOPICAL
Status: COMPLETED | OUTPATIENT
Start: 2020-09-03 | End: 2020-09-03

## 2020-09-03 RX ORDER — HEPARIN SODIUM 5000 [USP'U]/ML
5000 INJECTION, SOLUTION INTRAVENOUS; SUBCUTANEOUS EVERY 8 HOURS
Status: COMPLETED | OUTPATIENT
Start: 2020-09-03 | End: 2020-09-03

## 2020-09-03 RX ORDER — LABETALOL HCL 20 MG/4 ML
10 SYRINGE (ML) INTRAVENOUS EVERY 6 HOURS PRN
Status: DISCONTINUED | OUTPATIENT
Start: 2020-09-03 | End: 2020-09-07

## 2020-09-03 RX ORDER — DEXMEDETOMIDINE HYDROCHLORIDE 100 UG/ML
INJECTION, SOLUTION INTRAVENOUS
Status: DISCONTINUED | OUTPATIENT
Start: 2020-09-03 | End: 2020-09-03

## 2020-09-03 RX ORDER — TRAMADOL HYDROCHLORIDE 50 MG/1
50 TABLET ORAL EVERY 6 HOURS PRN
Status: DISCONTINUED | OUTPATIENT
Start: 2020-09-03 | End: 2020-09-07 | Stop reason: HOSPADM

## 2020-09-03 RX ORDER — EPHEDRINE SULFATE 50 MG/ML
INJECTION, SOLUTION INTRAVENOUS
Status: DISCONTINUED | OUTPATIENT
Start: 2020-09-03 | End: 2020-09-03

## 2020-09-03 RX ORDER — SODIUM CHLORIDE 0.9 % (FLUSH) 0.9 %
10 SYRINGE (ML) INJECTION
Status: DISCONTINUED | OUTPATIENT
Start: 2020-09-03 | End: 2020-09-07 | Stop reason: HOSPADM

## 2020-09-03 RX ORDER — IBUPROFEN 400 MG/1
800 TABLET ORAL EVERY 8 HOURS
Status: DISCONTINUED | OUTPATIENT
Start: 2020-09-04 | End: 2020-09-07 | Stop reason: HOSPADM

## 2020-09-03 RX ORDER — BUPIVACAINE HYDROCHLORIDE 2.5 MG/ML
INJECTION, SOLUTION EPIDURAL; INFILTRATION; INTRACAUDAL
Status: DISCONTINUED | OUTPATIENT
Start: 2020-09-03 | End: 2020-09-03 | Stop reason: HOSPADM

## 2020-09-03 RX ORDER — SODIUM CHLORIDE 0.9 % (FLUSH) 0.9 %
10 SYRINGE (ML) INJECTION
Status: DISCONTINUED | OUTPATIENT
Start: 2020-09-03 | End: 2020-09-03 | Stop reason: HOSPADM

## 2020-09-03 RX ORDER — MUPIROCIN 20 MG/G
OINTMENT TOPICAL 2 TIMES DAILY
Status: DISCONTINUED | OUTPATIENT
Start: 2020-09-03 | End: 2020-09-07 | Stop reason: HOSPADM

## 2020-09-03 RX ORDER — ENOXAPARIN SODIUM 100 MG/ML
40 INJECTION SUBCUTANEOUS EVERY 24 HOURS
Status: DISCONTINUED | OUTPATIENT
Start: 2020-09-04 | End: 2020-09-07 | Stop reason: HOSPADM

## 2020-09-03 RX ORDER — IBUPROFEN 200 MG
16 TABLET ORAL
Status: DISCONTINUED | OUTPATIENT
Start: 2020-09-03 | End: 2020-09-07 | Stop reason: HOSPADM

## 2020-09-03 RX ORDER — ACETAMINOPHEN 10 MG/ML
1000 INJECTION, SOLUTION INTRAVENOUS EVERY 8 HOURS
Status: COMPLETED | OUTPATIENT
Start: 2020-09-03 | End: 2020-09-04

## 2020-09-03 RX ORDER — SODIUM CHLORIDE 9 MG/ML
INJECTION, SOLUTION INTRAVENOUS CONTINUOUS
Status: DISCONTINUED | OUTPATIENT
Start: 2020-09-03 | End: 2020-09-03

## 2020-09-03 RX ORDER — GABAPENTIN 300 MG/1
300 CAPSULE ORAL
Status: COMPLETED | OUTPATIENT
Start: 2020-09-03 | End: 2020-09-03

## 2020-09-03 RX ORDER — ONDANSETRON 2 MG/ML
4 INJECTION INTRAMUSCULAR; INTRAVENOUS DAILY PRN
Status: DISCONTINUED | OUTPATIENT
Start: 2020-09-03 | End: 2020-09-03 | Stop reason: HOSPADM

## 2020-09-03 RX ORDER — OXYCODONE HYDROCHLORIDE 5 MG/1
5 TABLET ORAL EVERY 4 HOURS PRN
Status: DISCONTINUED | OUTPATIENT
Start: 2020-09-03 | End: 2020-09-07 | Stop reason: HOSPADM

## 2020-09-03 RX ORDER — ONDANSETRON 2 MG/ML
INJECTION INTRAMUSCULAR; INTRAVENOUS
Status: DISCONTINUED | OUTPATIENT
Start: 2020-09-03 | End: 2020-09-03

## 2020-09-03 RX ORDER — GABAPENTIN 300 MG/1
300 CAPSULE ORAL 3 TIMES DAILY
Status: DISCONTINUED | OUTPATIENT
Start: 2020-09-03 | End: 2020-09-07 | Stop reason: HOSPADM

## 2020-09-03 RX ORDER — ROCURONIUM BROMIDE 10 MG/ML
INJECTION, SOLUTION INTRAVENOUS
Status: DISCONTINUED | OUTPATIENT
Start: 2020-09-03 | End: 2020-09-03

## 2020-09-03 RX ORDER — TRIPROLIDINE/PSEUDOEPHEDRINE 2.5MG-60MG
600 TABLET ORAL
Status: COMPLETED | OUTPATIENT
Start: 2020-09-03 | End: 2020-09-03

## 2020-09-03 RX ORDER — LIDOCAINE HCL/PF 100 MG/5ML
SYRINGE (ML) INTRAVENOUS
Status: DISCONTINUED | OUTPATIENT
Start: 2020-09-03 | End: 2020-09-03

## 2020-09-03 RX ORDER — LIDOCAINE HYDROCHLORIDE 10 MG/ML
INJECTION INFILTRATION; PERINEURAL
Status: DISCONTINUED | OUTPATIENT
Start: 2020-09-03 | End: 2020-09-03 | Stop reason: HOSPADM

## 2020-09-03 RX ORDER — DEXAMETHASONE SODIUM PHOSPHATE 4 MG/ML
INJECTION, SOLUTION INTRA-ARTICULAR; INTRALESIONAL; INTRAMUSCULAR; INTRAVENOUS; SOFT TISSUE
Status: DISCONTINUED | OUTPATIENT
Start: 2020-09-03 | End: 2020-09-03

## 2020-09-03 RX ORDER — PHENYLEPHRINE HYDROCHLORIDE 10 MG/ML
INJECTION INTRAVENOUS
Status: DISCONTINUED | OUTPATIENT
Start: 2020-09-03 | End: 2020-09-03

## 2020-09-03 RX ORDER — KETAMINE HCL IN 0.9 % NACL 50 MG/5 ML
SYRINGE (ML) INTRAVENOUS
Status: DISCONTINUED | OUTPATIENT
Start: 2020-09-03 | End: 2020-09-03

## 2020-09-03 RX ORDER — ACETAMINOPHEN 650 MG/20.3ML
975 LIQUID ORAL
Status: COMPLETED | OUTPATIENT
Start: 2020-09-03 | End: 2020-09-03

## 2020-09-03 RX ORDER — METRONIDAZOLE 500 MG/100ML
500 INJECTION, SOLUTION INTRAVENOUS
Status: COMPLETED | OUTPATIENT
Start: 2020-09-03 | End: 2020-09-03

## 2020-09-03 RX ORDER — LIDOCAINE HYDROCHLORIDE AND EPINEPHRINE 10; 10 MG/ML; UG/ML
INJECTION, SOLUTION INFILTRATION; PERINEURAL
Status: DISCONTINUED | OUTPATIENT
Start: 2020-09-03 | End: 2020-09-03 | Stop reason: HOSPADM

## 2020-09-03 RX ORDER — HYDRALAZINE HYDROCHLORIDE 20 MG/ML
10 INJECTION INTRAMUSCULAR; INTRAVENOUS EVERY 6 HOURS PRN
Status: DISCONTINUED | OUTPATIENT
Start: 2020-09-03 | End: 2020-09-07

## 2020-09-03 RX ORDER — FENTANYL CITRATE 50 UG/ML
25 INJECTION, SOLUTION INTRAMUSCULAR; INTRAVENOUS EVERY 5 MIN PRN
Status: COMPLETED | OUTPATIENT
Start: 2020-09-03 | End: 2020-09-03

## 2020-09-03 RX ORDER — ONDANSETRON 2 MG/ML
4 INJECTION INTRAMUSCULAR; INTRAVENOUS EVERY 12 HOURS PRN
Status: DISCONTINUED | OUTPATIENT
Start: 2020-09-03 | End: 2020-09-07 | Stop reason: HOSPADM

## 2020-09-03 RX ORDER — GLUCAGON 1 MG
1 KIT INJECTION
Status: DISCONTINUED | OUTPATIENT
Start: 2020-09-03 | End: 2020-09-07 | Stop reason: HOSPADM

## 2020-09-03 RX ORDER — SODIUM CHLORIDE, SODIUM LACTATE, POTASSIUM CHLORIDE, CALCIUM CHLORIDE 600; 310; 30; 20 MG/100ML; MG/100ML; MG/100ML; MG/100ML
INJECTION, SOLUTION INTRAVENOUS CONTINUOUS
Status: DISCONTINUED | OUTPATIENT
Start: 2020-09-03 | End: 2020-09-04

## 2020-09-03 RX ORDER — LIDOCAINE HYDROCHLORIDE ANHYDROUS AND DEXTROSE MONOHYDRATE .8; 5 G/100ML; G/100ML
INJECTION, SOLUTION INTRAVENOUS CONTINUOUS PRN
Status: DISCONTINUED | OUTPATIENT
Start: 2020-09-03 | End: 2020-09-03

## 2020-09-03 RX ORDER — ALBUMIN HUMAN 50 G/1000ML
SOLUTION INTRAVENOUS CONTINUOUS PRN
Status: DISCONTINUED | OUTPATIENT
Start: 2020-09-03 | End: 2020-09-03

## 2020-09-03 RX ORDER — SODIUM CHLORIDE 9 MG/ML
INJECTION, SOLUTION INTRAVENOUS
Status: COMPLETED | OUTPATIENT
Start: 2020-09-03 | End: 2020-09-03

## 2020-09-03 RX ORDER — MIDAZOLAM HYDROCHLORIDE 1 MG/ML
INJECTION, SOLUTION INTRAMUSCULAR; INTRAVENOUS
Status: DISCONTINUED | OUTPATIENT
Start: 2020-09-03 | End: 2020-09-03

## 2020-09-03 RX ORDER — OXYCODONE HYDROCHLORIDE 10 MG/1
10 TABLET ORAL EVERY 4 HOURS PRN
Status: DISCONTINUED | OUTPATIENT
Start: 2020-09-03 | End: 2020-09-07 | Stop reason: HOSPADM

## 2020-09-03 RX ORDER — PROPOFOL 10 MG/ML
VIAL (ML) INTRAVENOUS
Status: DISCONTINUED | OUTPATIENT
Start: 2020-09-03 | End: 2020-09-03

## 2020-09-03 RX ORDER — IBUPROFEN 200 MG
24 TABLET ORAL
Status: DISCONTINUED | OUTPATIENT
Start: 2020-09-03 | End: 2020-09-07 | Stop reason: HOSPADM

## 2020-09-03 RX ORDER — FENTANYL CITRATE 50 UG/ML
INJECTION, SOLUTION INTRAMUSCULAR; INTRAVENOUS
Status: DISCONTINUED | OUTPATIENT
Start: 2020-09-03 | End: 2020-09-03

## 2020-09-03 RX ORDER — ACETAMINOPHEN 500 MG
1000 TABLET ORAL EVERY 8 HOURS
Status: DISCONTINUED | OUTPATIENT
Start: 2020-09-04 | End: 2020-09-07 | Stop reason: HOSPADM

## 2020-09-03 RX ORDER — ACETAMINOPHEN 10 MG/ML
INJECTION, SOLUTION INTRAVENOUS
Status: DISCONTINUED | OUTPATIENT
Start: 2020-09-03 | End: 2020-09-03

## 2020-09-03 RX ORDER — GABAPENTIN 300 MG/1
300 CAPSULE ORAL 3 TIMES DAILY
Status: DISCONTINUED | OUTPATIENT
Start: 2020-09-03 | End: 2020-09-07

## 2020-09-03 RX ORDER — HYDRALAZINE HYDROCHLORIDE 20 MG/ML
INJECTION INTRAMUSCULAR; INTRAVENOUS
Status: COMPLETED
Start: 2020-09-03 | End: 2020-09-03

## 2020-09-03 RX ADMIN — PHENYLEPHRINE HYDROCHLORIDE 100 MCG: 10 INJECTION INTRAVENOUS at 02:09

## 2020-09-03 RX ADMIN — HYDRALAZINE HYDROCHLORIDE 10 MG: 20 INJECTION INTRAMUSCULAR; INTRAVENOUS at 07:09

## 2020-09-03 RX ADMIN — LIDOCAINE HYDROCHLORIDE 100 MG: 20 INJECTION, SOLUTION INTRAVENOUS at 07:09

## 2020-09-03 RX ADMIN — SODIUM CHLORIDE, SODIUM GLUCONATE, SODIUM ACETATE, POTASSIUM CHLORIDE, MAGNESIUM CHLORIDE, SODIUM PHOSPHATE, DIBASIC, AND POTASSIUM PHOSPHATE: .53; .5; .37; .037; .03; .012; .00082 INJECTION, SOLUTION INTRAVENOUS at 07:09

## 2020-09-03 RX ADMIN — Medication 20 MG: at 07:09

## 2020-09-03 RX ADMIN — ROCURONIUM BROMIDE 20 MG: 10 INJECTION, SOLUTION INTRAVENOUS at 09:09

## 2020-09-03 RX ADMIN — DEXMEDETOMIDINE HYDROCHLORIDE 4 MCG: 100 INJECTION, SOLUTION INTRAVENOUS at 02:09

## 2020-09-03 RX ADMIN — ROCURONIUM BROMIDE 10 MG: 10 INJECTION, SOLUTION INTRAVENOUS at 11:09

## 2020-09-03 RX ADMIN — ROCURONIUM BROMIDE 50 MG: 10 INJECTION, SOLUTION INTRAVENOUS at 07:09

## 2020-09-03 RX ADMIN — ALBUMIN (HUMAN): 12.5 SOLUTION INTRAVENOUS at 12:09

## 2020-09-03 RX ADMIN — PHENYLEPHRINE HYDROCHLORIDE 100 MCG: 10 INJECTION INTRAVENOUS at 11:09

## 2020-09-03 RX ADMIN — FENTANYL CITRATE 25 MCG: 50 INJECTION INTRAMUSCULAR; INTRAVENOUS at 03:09

## 2020-09-03 RX ADMIN — ACETAMINOPHEN 976.6 MG: 160 SOLUTION ORAL at 05:09

## 2020-09-03 RX ADMIN — FENTANYL CITRATE 25 MCG: 50 INJECTION, SOLUTION INTRAMUSCULAR; INTRAVENOUS at 02:09

## 2020-09-03 RX ADMIN — SODIUM CHLORIDE: 0.9 INJECTION, SOLUTION INTRAVENOUS at 05:09

## 2020-09-03 RX ADMIN — ACETAMINOPHEN 1000 MG: 10 INJECTION, SOLUTION INTRAVENOUS at 10:09

## 2020-09-03 RX ADMIN — ROCURONIUM BROMIDE 10 MG: 10 INJECTION, SOLUTION INTRAVENOUS at 12:09

## 2020-09-03 RX ADMIN — Medication 10 MG: at 01:09

## 2020-09-03 RX ADMIN — CEFTRIAXONE SODIUM 2 G: 2 INJECTION, SOLUTION INTRAVENOUS at 07:09

## 2020-09-03 RX ADMIN — Medication 10 MG: at 12:09

## 2020-09-03 RX ADMIN — PHENYLEPHRINE HYDROCHLORIDE 100 MCG: 10 INJECTION INTRAVENOUS at 10:09

## 2020-09-03 RX ADMIN — PROPOFOL 170 MG: 10 INJECTION, EMULSION INTRAVENOUS at 07:09

## 2020-09-03 RX ADMIN — SODIUM CHLORIDE: 0.9 INJECTION, SOLUTION INTRAVENOUS at 07:09

## 2020-09-03 RX ADMIN — ROCURONIUM BROMIDE 10 MG: 10 INJECTION, SOLUTION INTRAVENOUS at 01:09

## 2020-09-03 RX ADMIN — ALVIMOPAN 12 MG: 12 CAPSULE ORAL at 10:09

## 2020-09-03 RX ADMIN — MIDAZOLAM HYDROCHLORIDE 2 MG: 1 INJECTION, SOLUTION INTRAMUSCULAR; INTRAVENOUS at 07:09

## 2020-09-03 RX ADMIN — DEXAMETHASONE SODIUM PHOSPHATE 8 MG: 4 INJECTION, SOLUTION INTRAMUSCULAR; INTRAVENOUS at 07:09

## 2020-09-03 RX ADMIN — FENTANYL CITRATE 100 MCG: 50 INJECTION, SOLUTION INTRAMUSCULAR; INTRAVENOUS at 07:09

## 2020-09-03 RX ADMIN — Medication 10 MG: at 08:09

## 2020-09-03 RX ADMIN — GABAPENTIN 300 MG: 300 CAPSULE ORAL at 04:09

## 2020-09-03 RX ADMIN — OXYCODONE HYDROCHLORIDE 10 MG: 10 TABLET ORAL at 10:09

## 2020-09-03 RX ADMIN — OXYCODONE HYDROCHLORIDE 10 MG: 10 TABLET ORAL at 04:09

## 2020-09-03 RX ADMIN — ACETAMINOPHEN 1000 MG: 10 INJECTION, SOLUTION INTRAVENOUS at 02:09

## 2020-09-03 RX ADMIN — PHENYLEPHRINE HYDROCHLORIDE 100 MCG: 10 INJECTION INTRAVENOUS at 12:09

## 2020-09-03 RX ADMIN — Medication 15 MG: at 11:09

## 2020-09-03 RX ADMIN — IBUPROFEN 800 MG: 800 INJECTION INTRAVENOUS at 10:09

## 2020-09-03 RX ADMIN — DEXMEDETOMIDINE HYDROCHLORIDE 4 MCG: 100 INJECTION, SOLUTION INTRAVENOUS at 09:09

## 2020-09-03 RX ADMIN — Medication 10 MG: at 09:09

## 2020-09-03 RX ADMIN — SODIUM CHLORIDE, SODIUM LACTATE, POTASSIUM CHLORIDE, AND CALCIUM CHLORIDE: .6; .31; .03; .02 INJECTION, SOLUTION INTRAVENOUS at 04:09

## 2020-09-03 RX ADMIN — LIDOCAINE HYDROCHLORIDE 10 MG: 10 INJECTION, SOLUTION EPIDURAL; INFILTRATION; INTRACAUDAL; PERINEURAL at 05:09

## 2020-09-03 RX ADMIN — LIDOCAINE HYDROCHLORIDE 0.03 MG/KG/MIN: 8 INJECTION, SOLUTION INTRAVENOUS at 07:09

## 2020-09-03 RX ADMIN — Medication 15 MG: at 10:09

## 2020-09-03 RX ADMIN — DEXMEDETOMIDINE HYDROCHLORIDE 4 MCG: 100 INJECTION, SOLUTION INTRAVENOUS at 11:09

## 2020-09-03 RX ADMIN — HEPARIN SODIUM 5000 UNITS: 5000 INJECTION INTRAVENOUS; SUBCUTANEOUS at 05:09

## 2020-09-03 RX ADMIN — IBUPROFEN 600 MG: 100 SUSPENSION ORAL at 05:09

## 2020-09-03 RX ADMIN — DEXMEDETOMIDINE HYDROCHLORIDE 4 MCG: 100 INJECTION, SOLUTION INTRAVENOUS at 10:09

## 2020-09-03 RX ADMIN — DEXMEDETOMIDINE HYDROCHLORIDE 4 MCG: 100 INJECTION, SOLUTION INTRAVENOUS at 12:09

## 2020-09-03 RX ADMIN — SUGAMMADEX 200 MG: 100 INJECTION, SOLUTION INTRAVENOUS at 02:09

## 2020-09-03 RX ADMIN — FENTANYL CITRATE 50 MCG: 50 INJECTION, SOLUTION INTRAMUSCULAR; INTRAVENOUS at 02:09

## 2020-09-03 RX ADMIN — ROCURONIUM BROMIDE 10 MG: 10 INJECTION, SOLUTION INTRAVENOUS at 10:09

## 2020-09-03 RX ADMIN — PHENYLEPHRINE HYDROCHLORIDE 200 MCG: 10 INJECTION INTRAVENOUS at 12:09

## 2020-09-03 RX ADMIN — METRONIDAZOLE 500 MG: 500 SOLUTION INTRAVENOUS at 07:09

## 2020-09-03 RX ADMIN — ONDANSETRON 4 MG: 2 INJECTION, SOLUTION INTRAMUSCULAR; INTRAVENOUS at 02:09

## 2020-09-03 RX ADMIN — DEXMEDETOMIDINE HYDROCHLORIDE 8 MCG: 100 INJECTION, SOLUTION INTRAVENOUS at 07:09

## 2020-09-03 RX ADMIN — GABAPENTIN 300 MG: 300 CAPSULE ORAL at 05:09

## 2020-09-03 RX ADMIN — GABAPENTIN 300 MG: 300 CAPSULE ORAL at 10:09

## 2020-09-03 RX ADMIN — SODIUM CHLORIDE, SODIUM GLUCONATE, SODIUM ACETATE, POTASSIUM CHLORIDE, MAGNESIUM CHLORIDE, SODIUM PHOSPHATE, DIBASIC, AND POTASSIUM PHOSPHATE: .53; .5; .37; .037; .03; .012; .00082 INJECTION, SOLUTION INTRAVENOUS at 11:09

## 2020-09-03 RX ADMIN — DEXMEDETOMIDINE HYDROCHLORIDE 4 MCG: 100 INJECTION, SOLUTION INTRAVENOUS at 01:09

## 2020-09-03 RX ADMIN — MUPIROCIN 1 G: 20 OINTMENT TOPICAL at 05:09

## 2020-09-03 RX ADMIN — EPHEDRINE SULFATE 10 MG: 50 INJECTION INTRAVENOUS at 08:09

## 2020-09-03 NOTE — PLAN OF CARE
Frozen specimens #2 and #3 sent off @ 1150 with KRISHNA Merino    Frozen specimen # 4 and permanent specimen #1 taken to pathology by Dr. Galvin and Dr. Meza @ 1154.    Informed pathology know that specimen requisition sheet was not sent with permanent specimen. Was told to record in book and place requisition in path fridge.

## 2020-09-03 NOTE — BRIEF OP NOTE
[facility]  Brief Operative Note    SUMMARY     Surgery Date: 9/3/2020     Surgeon(s) and Role:     * ENMANUEL Galvin MD - Primary     * Elder Meza MD - Resident - Assisting        Pre-op Diagnosis:  Rectal cancer [C20]    Post-op Diagnosis:  Post-Op Diagnosis Codes:     * Rectal cancer [C20]    Procedure(s) (LRB):  PROCTECTOMY, PULL-THROUGH, ABDOMINOPERINEAL (N/A)  RESECTION, COLON, LOW ANTERIOR  MOBILIZATION, SPLENIC FLEXURE  WRAP-OMENTAL (N/A)  SIGMOIDOSCOPY, FLEXIBLE    Anesthesia: General    Description of Procedure: Open ultra-low low anterior resection with retro-ileal trans-mesenteric coloanal anastomosis (Colonic J-pouch), diverting loop ileostomy, omental graft    Description of the findings of the procedure:   Low anterior residual tumor 3-4mm from dentate line, no evidence of metastatic disease on abdominal exploration. Intact anastomosis by endoscopic visualization.    Estimated Blood Loss: * No values recorded between 9/3/2020  7:52 AM and 9/3/2020  3:06 PM *    Estimated Blood Loss has not been documented. EBL = 200.         Specimens:   Specimen (12h ago, onward)    None          [id number]

## 2020-09-03 NOTE — OP NOTE
Date of procedure:   September 3, 2020    Indications for procedure:  64-year-old male with history of low rectal cancer status post total neoadjuvant chemotherapy and radiation therapy.This tumor is very low and extends into the anal canal just above the level of the dentate line.   Restaging of the tumor following total  Neoadjuvant therapy revealed that the tumor was done involving the pelvic floor or internal sphincter muscle.  I have had a lengthy discussion with the patient his wife regarding surgical options as he strongly desires anal sphincter preservation.  I have offered him low anterior resection with inter-sphinceric proctectomy and hand-sewn anastomosis and diverting ileostomy.  The functional consequences have been explained in detail.      Preoperative diagnosis:   Low rectal cancer    Postoperative diagnosis:  Same    Name of procedure:    1.  Exploratory laparotomy, ultra-low anterior resection with intersphincteric proctectomy, colonic J-pouch anal anastomosis hand-sewn  2.  Splenic flexure mobilization with ileal mesenteric window to facilitate anastomosis  3.  pedicled omental graft  4.  flexible sigmoidoscopy  5.  diverting loop ileostomy    Surgeon:   ENMANUEL Galvin MD    Assistant surgeon:  Elder Meza MD    Type of anesthesia:  GETA    EBL:  300  Cc's    Drains:  19 round [place] to pelvis    Specimen:    1.  Anal canal, rectum and sigmoid colon  2.  Anal canal anterior circumferential margin  3.  Anal canal, lateral circumferential margin  4.  Distal resection margin    Findings:  1.   Low rectal tumor, anterior and left anterior position without evidence of invasion of the muscle, sphincter.   The distal aspect of the tumor extended to within 2 mm of the dentate line in the left anterior position.  This required removal of internal sphincter muscle at the level of the dentate line anteriorly and towards the left lateral aspect.  Approximately 30-40% of the internal sphincter  muscle was taken with the specimen beginning at the dentate line in this area.  Submucosal dissection was carried out and the remaining anal canal.    Hand-sewn anastomosis was performed at the dentate line.  2.  No evidence of distant spread  3.   Flexible sigmoidoscopy    Technique in detail:  Patient was brought to the operating room positive identified and placed on the operating table supine position with sequential compression devices on his lower extremities.  The patient had undergone an outpatient oral mechanical and oral antibiotic bowel preparation.  He had been marked for both an ileostomy and colostomy.  He received subcutaneous heparin.  He received intravenous antibiotics.  He underwent general endotracheal anesthesia without complication.  Givens catheter orogastric tube were inserted.  His left arm was tucked at his side.  He was on a patient positioning system.  He was then positioned in the modified lithotomy position and padded Yellofin stirrups.  Distal rectal washout was performed.    A critical time-out was performed.    We performed an exam under anesthesia.  Digital rectal examination revealed a palpable ulcerated mass in the low rectum which extended into the anal canal by palpation.  The mass was mobile.  Carlson retractors were employed for exposure.  We could identify the mass above the level of the dentate line.  It slows point was in the left anterior position approximately 2 mm above the dentate line.  It was felt that this could be resected using an intersphincteric proctectomy technique and pull-through hand-sewn anastomosis.  We instilled Betadine solution into the rectum.  The patient was placed in low lithotomy position.  His abdomen and perineum were prepared and draped in usual fashion.    The patient was explored through a long midline incision which extended from the mid epigastrium to the symphysis pubis.  The abdomen was entered with care being taken to avoid injury to the  underlying viscera.  There was no evidence of any ascites.  There were no peritoneal implants careful palpation of the liver and diaphragm were unremarkable.  This EN route to the small bowel mesentery was dissected off the retroperitoneum.  The small bowel and the cecum were packed into the upper abdomen.  A bowel for retractor was employed for exposure.  The bladder was suspended out of the pelvis.  The left colon and sigmoid colon were then mobilized along the white line of Toldt and the bowel mesentery were carefully reflected back to the midline aorta.  The right side of the sigmoid mesentery was then scored and the inferior mesenteric artery was then carefully skeletonized at its takeoff from the aorta.  The vessel was transected between clamps and suture ligated with 2 0 Vicryl suture.  The end of the divided inferior mesenteric artery was then grasped with a Gloria clamp for points of orientation.  The ascending branch of left colic artery and the inferior mesenteric vein were then carefully isolated clamped and divided.  The mesentery of the bowel proximally was then divided cephalad to the 1st sigmoidal branch and left colic artery.  The mesentery was divided out to the descending colon sigmoid colon junction where the marginal artery of [place] was carefully skeletonized transected and allowed to bleed.  Pulsatile blood flow was noted.  The vessels ligated.  We transected bowel proximally at the distal descending colon using the echelon 60 stapling device with a 60 mm blue cartridge.  The descending colon was placed into the upper abdomen and packed.  The end of the sigmoid colon was then used as a handle.  The divided inferior mesenteric artery was used as a guide towards mobilization of the mesocolon onto the mesorectum posteriorly at the level of the sacral promontory.  In so doing we are able to preserve the superior hypogastric nerves.  The pelvic dissection was undertaken using headlight  illumination as well as lighted Katie retractors.  Total mesorectal excision was undertaken.  The peritoneum on either side of the mesorectum was scored and this was carried anteriorly into the cul-de-sac between the bladder and the rectum anteriorly.  Mesorectal dissection was then undertaken in a vascular plane posteriorly again using lighted Katie retractors for exposure.  This was dissected down to level of the pelvic floor.  Waldeyer's fascia was divided and the distal aspect of the mesorectum was carefully mobilized into the levator hiatus posteriorly.  Anteriorly the dissection was carried out between the leaves of Denonvillier's fascia with care being taken to avoid injury to the pelvic nerves.  The dissection was carried distally anteriorly and this facilitated the lateral mobilization of the mesorectum on either side such that circumferential dissection was carried out into the levator hiatus dissecting the levator muscles away from the distal rectal muscular tube caudal to the mesorectum itself.  I confirmed that I was adequately low by performing intraoperative digital rectal examination.    I then went to the perineum.  The patient was placed in high lithotomy position.  The perineum was re-prepped and draped.  Lone Star retractor was employed for exposure everting the anus.  The dentate line was carefully visualized.  Lighted Carlson retractor was employed for additional exposure.  The dentate line was scored using the Bovie.  1% lidocaine with epinephrine was used to raise the submucosa posteriorly where submucosal dissection was employed preserving the internal anal sphincter muscle.  A tumor specific intersphincteric dissection was then carried out in the left lateral position anteriorly and in the right anterior lateral position.  Essentially a jade circumference of the proximal aspect of the internal sphincter muscle was taken to provide adequate radial margin dissection of the anal canal where as  the jade circumference opposite the tumor was dissected in the submucosal plane.  Ultimately I was able to facilitate complete mobilization into the pelvis connecting the 2 dissections from above and below.  The specimen was closed distally with continuous Vicryl suture.  The specimen was brought out through the abdomen and placed on the back table.  We carefully inspected the specimen.  Although we had visibly started the dissection below the tumor in the right anterior position in the resected specimen the tumor appeared to be very close to the surgical margin.  Therefore we obtained additional full-thickness margin including the mucosa submucosa and internal sphincter in the right anterior and right lateral position.  This was sent to Surgical pathology with the suture marking the true distal resection margin.  Additional circumferential margins were also obtained and sent for frozen section in the anterior and left lateral positions.  Frozen section specimens were examined and there was no evidence of neoplasia.  A Betadine-soaked sponge was placed into the anal canal and a Lone Star retractor was removed.  Please note that we did place sutures at the ordered points for purposes of hand-sewn anastomosis.  The patient was placed in low lithotomy position.     I hand carried the specimen to Surgical pathology and discussed the patient's clinical diagnosis in operative findings with the pathologist.    I scrubbed back into the operating room.  We mobilized the splenic flexure completely back to level of the middle colic vessels.  The omentum was dissected completely off of the transverse colon and the lesser sac was entered.  We dissected the attachments off of the base of the pancreas and perinephric  fascia.  The inferior mesenteric vein was divided at the level of the base of the pancreas.  The left colon and transverse colon were straightened.  We stimulated a colonic J-pouch with limbs of 7 cm.  To obtain  adequate reach she was necessary that the end of the bowel the placed to the right of the ligament of Treitz and to pass through an ileal mesenteric window as the patient's mesentery was somewhat foreshortened.  The mesenteric window was created between the superior mesenteric artery and the ileocolic artery.  The transverse colon mesentery coursed straight and the bowel was brought through the mesenteric window.  We then fashioned the colonic pouch.  A stay suture was placed at the top of the pouch.  The apex of the pouch easily reached to the level of the inferior border of the symphysis pubis.  A colotomy was created at the apex and the echelon 60 stapling device with blue cartridges was fired along the anti mesenteric border of the bowel twice to create the pouch measuring 7 cm.  Orienting sutures were then placed at the colotomy defect.  The assistant surgeon grasp these stay sutures and the pouch was guided into the pelvis and into the anal canal for hand-sewn anastomosis.  A Bruce retractor was employed for exposure.  I went to the perineum.  The colonic pouch easily reached into the anal canal for hand-sewn anastomosis.  The previously placed Vicryl sutures at the ordinate points were then used to begin the anastomosis.  The sutures were placed into conduit proximally with good purchase of 4 mm and after the sutures were tied down the Mark retractor was replaced with mm lighted medium Carlson retractor.  We then placed 3 0 Vicryl sutures between the sutures at the cardinal points such that a total of 12 sutures were placed.  The anastomosis was palpated and noted to be intact.  I then performed flexible sigmoidoscopy.  The pouch and descending colon was noted to have excellent pink mucosa.  The staple line of the colonic pouch was noted to be hemostatic.  The pouch anal anastomosis was noted to be circumferential and intact.  The colonoscope was removed.    We placed a drain through all stab incision in the  left lower quadrant into the pelvis and placed it posterior to the colonic pouch.  We mobilized the omentum off of the right side of the transverse colon and a pedicled graft was created based upon the left gastroepiploic artery.  The omental graft was placed along the left colic gutter into the pelvis covering the colonic pouch.  The small bowel was returned to anatomic position.  A loop of ileum approximately 20 cm proximal to the ileocecal valve was employed for loop ileostomy.  An aperture was created in the right side of the abdomen through the rectus sheath with the muscle split bluntly and the aperture dilated to 2 finger breaths.  The loop of ileum was covered with Seprafilm and was brought up through the aperture with care being taken to properly orient the mesentery.  Transversus abdominis plane block was performed.  Additional sheets of Seprafilm were placed over the contents of the peritoneal cavity.    We used a separate closing tray to close the abdomen.  We changed gowns and gloves.  Midline fascia was approximated using looped 1 PDS suture.  The skin incision was approximated using 4 0 Monocryl and skin glue.  Drain was secured to skin using 2 nylon.  We matured the stoma as a loop stoma with a dominant everted functional and and a distal flush nonfunctional and using 3 0 Vicryl suture.  Stoma appliance was placed over the stoma.    Patient was returned to the supine position, awakened from anesthesia and extubated without incident.  He was returned to the recovery area in stable condition.    I was scrubbed and present for the entire operation except for the time when I hand carried the specimen to Surgical pathology.      DEDRICK Galvin MD

## 2020-09-03 NOTE — ANESTHESIA PROCEDURE NOTES
Intubation  Performed by: Naman Simpson MD  Authorized by: Samuel Jacobo MD     Intubation:     Induction:  Intravenous    Intubated:  Postinduction    Mask Ventilation:  Easy with oral airway    Attempts:  1    Attempted By:  Resident anesthesiologist    Method of Intubation:  Direct    Blade:  Simmons 2    Laryngeal View Grade: Grade I - full view of chords      Difficult Airway Encountered?: No      Complications:  None    Airway Device:  Oral endotracheal tube    Airway Device Size:  7.5    Style/Cuff Inflation:  Cuffed (inflated to minimal occlusive pressure)    Tube secured:  23    Secured at:  The teeth    Placement Verified By:  Capnometry    Complicating Factors:  None    Findings Post-Intubation:  BS equal bilateral

## 2020-09-03 NOTE — TRANSFER OF CARE
"Anesthesia Transfer of Care Note    Patient: Franky Peraza    Procedure(s) Performed: Procedure(s) (LRB):  PROCTECTOMY, PULL-THROUGH, ABDOMINOPERINEAL (N/A)  RESECTION, COLON, LOW ANTERIOR  MOBILIZATION, SPLENIC FLEXURE  WRAP-OMENTAL (N/A)  SIGMOIDOSCOPY, FLEXIBLE    Patient location: PACU    Anesthesia Type: general    Transport from OR: Transported from OR on 6-10 L/min O2 by face mask with adequate spontaneous ventilation    Post pain: adequate analgesia    Post assessment: no apparent anesthetic complications and tolerated procedure well    Post vital signs: stable    Level of consciousness: responds to stimulation    Nausea/Vomiting: no nausea/vomiting    Complications: none    Transfer of care protocol was followed      Last vitals:   Visit Vitals  /87 (BP Location: Right arm)   Pulse 76   Temp 37 °C (98.6 °F) (Oral)   Resp 16   Ht 5' 11" (1.803 m)   Wt 93.8 kg (206 lb 12.7 oz)   SpO2 99%   BMI 28.84 kg/m²     "

## 2020-09-04 LAB
ANION GAP SERPL CALC-SCNC: 8 MMOL/L (ref 8–16)
BASOPHILS # BLD AUTO: 0.01 K/UL (ref 0–0.2)
BASOPHILS NFR BLD: 0.2 % (ref 0–1.9)
BUN SERPL-MCNC: 14 MG/DL (ref 8–23)
CALCIUM SERPL-MCNC: 8.3 MG/DL (ref 8.7–10.5)
CHLORIDE SERPL-SCNC: 109 MMOL/L (ref 95–110)
CO2 SERPL-SCNC: 21 MMOL/L (ref 23–29)
CREAT SERPL-MCNC: 1.1 MG/DL (ref 0.5–1.4)
DIFFERENTIAL METHOD: ABNORMAL
EOSINOPHIL # BLD AUTO: 0 K/UL (ref 0–0.5)
EOSINOPHIL NFR BLD: 0 % (ref 0–8)
ERYTHROCYTE [DISTWIDTH] IN BLOOD BY AUTOMATED COUNT: 14 % (ref 11.5–14.5)
EST. GFR  (AFRICAN AMERICAN): >60 ML/MIN/1.73 M^2
EST. GFR  (NON AFRICAN AMERICAN): >60 ML/MIN/1.73 M^2
GLUCOSE SERPL-MCNC: 130 MG/DL (ref 70–110)
HCT VFR BLD AUTO: 36.8 % (ref 40–54)
HGB BLD-MCNC: 11.8 G/DL (ref 14–18)
IMM GRANULOCYTES # BLD AUTO: 0.01 K/UL (ref 0–0.04)
IMM GRANULOCYTES NFR BLD AUTO: 0.2 % (ref 0–0.5)
LYMPHOCYTES # BLD AUTO: 0.3 K/UL (ref 1–4.8)
LYMPHOCYTES NFR BLD: 7.2 % (ref 18–48)
MAGNESIUM SERPL-MCNC: 1.8 MG/DL (ref 1.6–2.6)
MCH RBC QN AUTO: 30.4 PG (ref 27–31)
MCHC RBC AUTO-ENTMCNC: 32.1 G/DL (ref 32–36)
MCV RBC AUTO: 95 FL (ref 82–98)
MONOCYTES # BLD AUTO: 0.5 K/UL (ref 0.3–1)
MONOCYTES NFR BLD: 10.9 % (ref 4–15)
NEUTROPHILS # BLD AUTO: 3.9 K/UL (ref 1.8–7.7)
NEUTROPHILS NFR BLD: 81.5 % (ref 38–73)
NRBC BLD-RTO: 0 /100 WBC
PHOSPHATE SERPL-MCNC: 2.6 MG/DL (ref 2.7–4.5)
PLATELET # BLD AUTO: 194 K/UL (ref 150–350)
PMV BLD AUTO: 9.1 FL (ref 9.2–12.9)
POCT GLUCOSE: 110 MG/DL (ref 70–110)
POCT GLUCOSE: 141 MG/DL (ref 70–110)
POCT GLUCOSE: 158 MG/DL (ref 70–110)
POCT GLUCOSE: 158 MG/DL (ref 70–110)
POTASSIUM SERPL-SCNC: 4.1 MMOL/L (ref 3.5–5.1)
RBC # BLD AUTO: 3.88 M/UL (ref 4.6–6.2)
SODIUM SERPL-SCNC: 138 MMOL/L (ref 136–145)
WBC # BLD AUTO: 4.75 K/UL (ref 3.9–12.7)

## 2020-09-04 PROCEDURE — 25000003 PHARM REV CODE 250: Performed by: STUDENT IN AN ORGANIZED HEALTH CARE EDUCATION/TRAINING PROGRAM

## 2020-09-04 PROCEDURE — 85025 COMPLETE CBC W/AUTO DIFF WBC: CPT

## 2020-09-04 PROCEDURE — 80048 BASIC METABOLIC PNL TOTAL CA: CPT

## 2020-09-04 PROCEDURE — 84100 ASSAY OF PHOSPHORUS: CPT

## 2020-09-04 PROCEDURE — 97535 SELF CARE MNGMENT TRAINING: CPT

## 2020-09-04 PROCEDURE — 83735 ASSAY OF MAGNESIUM: CPT

## 2020-09-04 PROCEDURE — 36415 COLL VENOUS BLD VENIPUNCTURE: CPT

## 2020-09-04 PROCEDURE — 63600175 PHARM REV CODE 636 W HCPCS: Performed by: STUDENT IN AN ORGANIZED HEALTH CARE EDUCATION/TRAINING PROGRAM

## 2020-09-04 PROCEDURE — 20600001 HC STEP DOWN PRIVATE ROOM

## 2020-09-04 PROCEDURE — 97165 OT EVAL LOW COMPLEX 30 MIN: CPT

## 2020-09-04 RX ADMIN — ALVIMOPAN 12 MG: 12 CAPSULE ORAL at 09:09

## 2020-09-04 RX ADMIN — MUPIROCIN: 20 OINTMENT TOPICAL at 08:09

## 2020-09-04 RX ADMIN — ACETAMINOPHEN 1000 MG: 10 INJECTION, SOLUTION INTRAVENOUS at 05:09

## 2020-09-04 RX ADMIN — GABAPENTIN 300 MG: 300 CAPSULE ORAL at 09:09

## 2020-09-04 RX ADMIN — IBUPROFEN 800 MG: 800 INJECTION INTRAVENOUS at 07:09

## 2020-09-04 RX ADMIN — IBUPROFEN 800 MG: 400 TABLET, FILM COATED ORAL at 09:09

## 2020-09-04 RX ADMIN — ACETAMINOPHEN 1000 MG: 10 INJECTION, SOLUTION INTRAVENOUS at 01:09

## 2020-09-04 RX ADMIN — IBUPROFEN 800 MG: 800 INJECTION INTRAVENOUS at 03:09

## 2020-09-04 RX ADMIN — ENOXAPARIN SODIUM 40 MG: 40 INJECTION SUBCUTANEOUS at 05:09

## 2020-09-04 RX ADMIN — ALVIMOPAN 12 MG: 12 CAPSULE ORAL at 08:09

## 2020-09-04 RX ADMIN — GABAPENTIN 300 MG: 300 CAPSULE ORAL at 03:09

## 2020-09-04 RX ADMIN — GABAPENTIN 300 MG: 300 CAPSULE ORAL at 08:09

## 2020-09-04 RX ADMIN — ACETAMINOPHEN 1000 MG: 500 TABLET ORAL at 09:09

## 2020-09-04 NOTE — PT/OT/SLP EVAL
"Occupational Therapy   Evaluation    Name: Franky Peraza  MRN: [id number]  Admitting Diagnosis:  <principal problem not specified> 1 Day Post-Op   Procedure(s):  PROCTECTOMY, PULL-THROUGH, ABDOMINOPERINEAL  RESECTION, COLON, LOW ANTERIOR  MOBILIZATION, SPLENIC FLEXURE  WRAP-OMENTAL  SIGMOIDOSCOPY, FLEXIBLE     Recommendations:     Discharge Recommendations: home  Discharge Equipment Recommendations:  none  Barriers to discharge:  None    Assessment:     Franky Peraza is a 64 y.o. male with a medical diagnosis of <principal problem not specified>.  He presents the following performance deficits affecting function: impaired endurance, impaired self care skills, impaired functional mobilty, gait instability, impaired balance, pain, impaired skin. Patient is agreeable to participate with therapy and tolerates evaluation well.     Rehab Prognosis: Good; patient would benefit from acute skilled OT services to address these deficits and reach maximum level of function.       Plan:     Patient to be seen 3 x/week to address the above listed problems via self-care/home management, therapeutic activities, therapeutic exercises  · Plan of Care Expires: 10/04/20  · Plan of Care Reviewed with: patient, spouse    Subjective     Chief Complaint: "This drain is annoying."  Patient/Family Comments/goals: To feel better and return home    Occupational Profile:  Living Environment: Patient lives with his wife in a [facility] with 0 ZUNILDA. His bathrooms have a WIS and a tub/shower combination but patient reports primarily using the WIS which has a built-in bench.   Previous level of function: Independent PTA  Roles and Routines: , father. 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 He helps out from time to time. He enjoys [employer] and camping. He stays active around the property and [place]el hay, feeds the cows, and walks 1.5 miles every day.  Equipment " Used at Home:  (built-in bench)  Assistance upon Discharge: Wife available to assist    Pain/Comfort:  · Pain Rating 1: 0/10    Patients cultural, spiritual, Sikh conflicts given the current situation: no    Objective:     Communicated with: RN prior to session.  Patient found HOB elevated with IRAIDA drain, peripheral IV, telemetry, oxygen(ileostomy) upon OT entry to room.    General Precautions: Standard, fall   Orthopedic Precautions:N/A   Braces: N/A     Occupational Performance:    Bed Mobility:    · Patient completed Supine to Sit to R side EOB with minimum assistance at trunk  · Patient completed Scooting anteriorly to EOB for foot placement on floor with supervision    Functional Mobility/Transfers:  · Patient completed Sit <> Stand Transfer x2 trials from EOB with contact guard assistance with no assistive device   · Functional Mobility: ~20' x2 with SBA-CGA with patient managing IV pole, no LOB/SOB noted    Activities of Daily Living:  · Grooming: stand by assistance Patient participated in oral hygiene and face wash with a washcloth while standing at the sink in the restroom with SBA.  · Toileting: stand by assistance Patient completed toileting into a measuring container while standing in the restroom with SBA.    Cognitive/Visual Perceptual:  Cognitive/Psychosocial Skills:     -       Oriented to: Person, Place, Time and Situation   -       Follows Commands/attention:Follows multistep  commands    Physical Exam:  Upper Extremity Range of Motion:     -       Right Upper Extremity: WNL  -       Left Upper Extremity: WNL  Upper Extremity Strength:    -       Right Upper Extremity: WNL  -       Left Upper Extremity: WNL   Strength:    -       Right Upper Extremity: WNL  -       Left Upper Extremity: WNL  Fine Motor Coordination:    -       Intact  Left hand thumb/finger opposition skills and Right hand thumb/finger opposition skills    AMPAC 6 Click ADL:  AMPAC Total Score: 19    Treatment &  Education:  Role of OT/evaluation  Educated on importance of sitting UIC/OOB activity with staff assistance while in acute setting to prevent debility  Call button for assistance  Education:    Patient left up in chair with all lines intact, call button in reach, RN notified and wife present    GOALS:   Multidisciplinary Problems     Occupational Therapy Goals        Problem: Occupational Therapy Goal    Goal Priority Disciplines Outcome Interventions   Occupational Therapy Goal     OT, PT/OT Ongoing, Progressing    Description: Goals to be met by: 9/18/20     Patient will increase functional independence with ADLs by performing:    UE Dressing with Supervision.  LE Dressing with Contact Guard Assistance.  Grooming while standing with Supervision.  Toileting from toilet with Supervision for hygiene and clothing management.   Supine to sit with Supervision.                     History:     Past Medical History:   Diagnosis Date    Diabetes mellitus        Past Surgical History:   Procedure Laterality Date    CATARACT EXTRACTION Bilateral     FLEXIBLE SIGMOIDOSCOPY N/A 8/11/2020    Procedure: SIGMOIDOSCOPY, FLEXIBLE;  Surgeon: ENMANUEL Galvin MD;  [facility]  Service: Endoscopy;  Laterality: N/A;  RAPID - Pt lives 2 hours away - [facility]    FLEXIBLE SIGMOIDOSCOPY N/A 9/3/2020    Procedure: SIGMOIDOSCOPY, FLEXIBLE;  Surgeon: ENMANUEL Galvin MD;  Location: [address];  Service: Colon and Rectal;  Laterality: N/A;    LOW ANTERIOR RESECTION OF COLON N/A 9/3/2020    Procedure: RESECTION, COLON, LOW ANTERIOR;  Surgeon: ENMANUEL Galvin MD;  [facility];  Service: Colon and Rectal;  Laterality: N/A;    MOBILIZATION OF SPLENIC FLEXURE N/A 9/3/2020    Procedure: MOBILIZATION, SPLENIC FLEXURE;  Surgeon: ENMANUEL Galvin MD;  [address];  Service: Colon and Rectal;  Laterality: N/A;    TONSILLECTOMY         Time Tracking:     OT Date of Treatment: 09/04/20  OT Start Time:  1032  OT Stop Time: 1100  OT Total Time (min): 28 min    Billable Minutes:Evaluation 13 minutes  Self Care/Home Management 15 minutes    Poornima Sandoval OT  9/4/2020

## 2020-09-04 NOTE — PROGRESS NOTES
[facility]  Colorectal Surgery  Progress Note    Patient Name: Franky Peraza  MRN: [id number]  Admission Date: 9/3/2020  Hospital Length of Stay: 1 days  Attending Physician: ENMANUEL Galvin MD    Subjective:     Interval History: NAEON. Doing well this am, denies pain. Tolerating diet.    Post-Op Info:  Procedure(s) (LRB):  PROCTECTOMY, PULL-THROUGH, ABDOMINOPERINEAL (N/A)  RESECTION, COLON, LOW ANTERIOR (N/A)  MOBILIZATION, SPLENIC FLEXURE (N/A)  WRAP-OMENTAL (N/A)  SIGMOIDOSCOPY, FLEXIBLE (N/A)   1 Day Post-Op      Medications:  Continuous Infusions:  Scheduled Meds:   acetaminophen  1,000 mg Intravenous Q8H    Followed by    acetaminophen  1,000 mg Oral Q8H    alvimopan  12 mg Oral BID    enoxaparin  40 mg Subcutaneous Q24H    gabapentin  300 mg Oral TID    gabapentin  300 mg Oral TID    ibuprofen  800 mg Intravenous Q8H    Followed by    ibuprofen  800 mg Oral Q8H    mupirocin   Nasal BID     PRN Meds:   dextrose 50%    dextrose 50%    glucagon (human recombinant)    glucose    glucose    hydrALAZINE    insulin aspart U-100    labetalol    ondansetron    oxyCODONE    oxyCODONE    promethazine (PHENERGAN) IVPB    sodium chloride 0.9%    traMADoL        Objective:     Vital Signs (Most Recent):  Temp: 98.1 °F (36.7 °C) (09/04/20 0357)  Pulse: 89 (09/04/20 0357)  Resp: 18 (09/04/20 0357)  BP: 129/74 (09/04/20 0357)  SpO2: 98 % (09/04/20 0357) Vital Signs (24h Range):  Temp:  [97.2 °F (36.2 °C)-98.5 °F (36.9 °C)] 98.1 °F (36.7 °C)  Pulse:  [76-95] 89  Resp:  [10-20] 18  SpO2:  [89 %-100 %] 98 %  BP: (128-192)/(69-96) 129/74     Intake/Output - Last 3 Shifts       09/02 0700 - 09/03 0659 09/03 0700 - 09/04 0659 09/04 0700 - 09/05 0659    I.V. (mL/kg)  3420 (36.5)     IV Piggyback  350     Total Intake(mL/kg)  3770 (40.2)     Urine (mL/kg/hr)  1150 (0.5)     Drains  275     Stool  25     Total Output  1450     Net  +2320            Stool Occurrence  0 x           Physical  Exam  Cardiovascular:      Rate and Rhythm: Normal rate.   Pulmonary:      Effort: Pulmonary effort is normal.   Abdominal:      General: There is no distension.      Palpations: Abdomen is soft.      Tenderness: There is no abdominal tenderness.      Comments: Ostomy pink with bowel sweat in bag, Drain w/ss output  Neurological:      Mental Status: He is alert.         Significant Labs:  BMP:   Recent Labs   Lab 09/04/20  0720   *      K 4.1      CO2 21*   BUN 14   CREATININE 1.1   CALCIUM 8.3*   MG 1.8     CBC:   Recent Labs   Lab 09/04/20  0720   WBC 4.75   RBC 3.88*   HGB 11.8*   HCT 36.8*      MCV 95   MCH 30.4   MCHC 32.1         Assessment/Plan:     Rectal cancer  63yo M s/p APR    - Reg diet  - d/c IVF, rodriguez  - PRN pain meds and antiemetics  - DVT ppx  - OOB, ambulate          Ananth Alonso MD  Colorectal Surgery  [facility]

## 2020-09-04 NOTE — ANESTHESIA POSTPROCEDURE EVALUATION
Anesthesia Post Evaluation    Patient: Franky Peraza    Procedure(s) Performed: Procedure(s) (LRB):  PROCTECTOMY, PULL-THROUGH, ABDOMINOPERINEAL (N/A)  RESECTION, COLON, LOW ANTERIOR (N/A)  MOBILIZATION, SPLENIC FLEXURE (N/A)  WRAP-OMENTAL (N/A)  SIGMOIDOSCOPY, FLEXIBLE (N/A)    Final Anesthesia Type: general    Patient location during evaluation: PACU  Patient participation: Yes- Able to Participate  Level of consciousness: awake and alert, awake and oriented  Post-procedure vital signs: reviewed and stable  Pain management: adequate  Airway patency: patent    PONV status at discharge: No PONV  Anesthetic complications: no      Cardiovascular status: blood pressure returned to baseline, hemodynamically stable and stable  Respiratory status: unassisted, spontaneous ventilation and room air  Hydration status: euvolemic  Follow-up not needed.          Vitals Value Taken Time   /74 09/04/20 0357   Temp 36.7 °C (98.1 °F) 09/04/20 0357   Pulse 89 09/04/20 0357   Resp 18 09/04/20 0357   SpO2 98 % 09/04/20 0357         Event Time   Out of Recovery 15:45:00         Pain/Donnell Score: Pain Rating Prior to Med Admin: 0 (9/4/2020  5:46 AM)  Pain Rating Post Med Admin: 4 (9/3/2020  4:29 PM)  Donnell Score: 9 (9/3/2020  3:45 PM)

## 2020-09-04 NOTE — PLAN OF CARE
Problem: Occupational Therapy Goal  Goal: Occupational Therapy Goal  Description: Goals to be met by: 9/18/20     Patient will increase functional independence with ADLs by performing:    UE Dressing with Supervision.  LE Dressing with Contact Guard Assistance.  Grooming while standing with Supervision.  Toileting from toilet with Supervision for hygiene and clothing management.   Supine to sit with Supervision.    Outcome: Ongoing, Progressing    OT evaluation completed and POC established.  Poornima Sandoval OT  9/4/2020

## 2020-09-04 NOTE — PROGRESS NOTES
Ostomy care follow up:  Reviewed ostomy care with pt and wife at bedside.  No further questions at this time.  All supplies for home are at bedside. Contact phone numbers provided and pt plans for appt with outpt clinic for follow up ostomy care. [id number]

## 2020-09-04 NOTE — ASSESSMENT & PLAN NOTE
63yo M s/p APR    - Reg diet  - d/c IVF, rodriguez  - PRN pain meds and antiemetics  - DVT ppx  - OOB, ambulate

## 2020-09-04 NOTE — CONSULTS
Wound care consult received.  Pt with history of rectal cancer, s/p APR.  Pt with wife at bedside stating they received ostomy education from Katlin Busby NP in the outpt clinic and have no questions at this time. Discussed goals of ostomy education with pt and wife.   Pouch emptying, sizing/cuting pouch, and applying pouch   Stoma and freddy-stomal care   Food choices and hydration   Obtaining supplies  Discussed and demonstrated cutting ostomy pouch and emptying pouch of stool and gas.  Patient returned practice demonstration without difficulty and able to change his ostomy pouch with little assistance.    Discussed meal planning with particular foods to avoid.  Discussed importance of hydration and increasing fluid intake, providing  I&O documentation sheet.  Explained process of ordering supplies to be delivered to patients home. Provided reading materials regarding todays training.  Pt and wife state high confidence to perform ostomy care at home. Will place order for home supplies from [employer] and [employer] and check with pt later this afternoon for any questions or concerns. Notified nurse of care provided. Unfortunately, photo of stoma not taken during this lesson. [id number]

## 2020-09-04 NOTE — NURSING TRANSFER
Nursing Transfer Note      9/3/2020     Transfer To: 1009    Transfer via bed    Transfer with cardiac monitoring, iv pump    Transported by pct x2    Medicines sent: ivf    Chart send with patient: Yes    Notified: spouse    Patient reassessed at: 9/3/20 @ 2000

## 2020-09-04 NOTE — PLAN OF CARE
POC reviewed w/ pt, verbalized understanding. Pt AAOx4. VSS on 2L NC. Pain managed w/ PRN pain meds. LR @ 40ml/hr. ML incision, dermabond; open to air, WDL. LLQ IRAIDA drain serosanguinous. Ileostomy RLQ; WDL rosebud, protruding above skin, moist. Pt voids per rodriguez w/ adequate UOP overnight. Pt tolerated clear liquid diet. Pt denies any N/V overnight. Pt slept b/w care. Frequent rounds made for pt safety. Call light in reach. Bed in lowest position and locked. WCTM.

## 2020-09-04 NOTE — PLAN OF CARE
Admit Date:  9/3/2020  4:43 AM      Admit Diagnosis:  Rectal cancer [C20]  Rectal cancer [C20]  Rectal cancer [C20]  Rectal cancer [C20]      CM met with patient in room for Discharge Planning Assessment. Per patient, he lives with his wife in a house with 0 step(s) to enter.   Per patient, he was independent with ADLS and did not use DME for ambulation.  Patient will have assistance from daughter upon discharge.   Discharge Planning Booklet given to patient and discussed.  All questions addressed.  CM will follow for needs.     09/04/20 1243   Discharge Assessment   Assessment Type Discharge Planning Assessment   Confirmed/corrected address and phone number on facesheet? Yes   Assessment information obtained from? Patient   Expected Length of Stay (days) 5   Communicated expected length of stay with patient/caregiver yes   Prior to hospitilization cognitive status: Alert/Oriented   Prior to hospitalization functional status: Independent   Current cognitive status: Alert/Oriented   Current Functional Status: Independent   Lives With spouse   Able to Return to Prior Arrangements yes   Is patient able to care for self after discharge? Yes   Patient's perception of discharge disposition home or selfcare   Readmission Within the Last 30 Days no previous admission in last 30 days   Patient currently being followed by outpatient case management? No   Patient currently receives any other outside agency services? No   Equipment Currently Used at Home none   Do you have any problems affording any of your prescribed medications? No   Is the patient taking medications as prescribed? yes   Does the patient have transportation home? Yes   Transportation Anticipated family or friend will provide   Does the patient receive services at the Coumadin Clinic? No   Discharge Plan A Home with family   Discharge Plan B Home Health   DME Needed Upon Discharge  other (see comments)  (TBD)   Patient/Family in Agreement with Plan yes             PCP:  Primary Doctor - [facility] in [place]   None        Pharmacy:    [facility] - [place], [place] - [address]  [address]  [place] [address]  Phone: [phone] Fax: [phone]        Emergency Contacts:  Extended Emergency Contact Information  Primary Emergency Contact: del duffy  Address: [address] [place] [address]           [place] [address] [facility]  Home Phone: [phone]  Mobile Phone: [phone]  Relation: Spouse  Preferred language: English   needed? No      Insurance:    Payor: HUMANA MANAGED MEDICARE / Plan: [employer]NA[employer]AB[employer] HE[employer] HMO SNP / Product Type: Medicare Advantage /       09/04/2020  12:49 PM    Aislinn Duarte RN, CM   Ext: [phone]

## 2020-09-04 NOTE — SUBJECTIVE & OBJECTIVE
Subjective:     Interval History: NAEON. Doing well this am, denies pain. Tolerating diet.    Post-Op Info:  Procedure(s) (LRB):  PROCTECTOMY, PULL-THROUGH, ABDOMINOPERINEAL (N/A)  RESECTION, COLON, LOW ANTERIOR (N/A)  MOBILIZATION, SPLENIC FLEXURE (N/A)  WRAP-OMENTAL (N/A)  SIGMOIDOSCOPY, FLEXIBLE (N/A)   1 Day Post-Op      Medications:  Continuous Infusions:  Scheduled Meds:   acetaminophen  1,000 mg Intravenous Q8H    Followed by    acetaminophen  1,000 mg Oral Q8H    alvimopan  12 mg Oral BID    enoxaparin  40 mg Subcutaneous Q24H    gabapentin  300 mg Oral TID    gabapentin  300 mg Oral TID    ibuprofen  800 mg Intravenous Q8H    Followed by    ibuprofen  800 mg Oral Q8H    mupirocin   Nasal BID     PRN Meds:   dextrose 50%    dextrose 50%    glucagon (human recombinant)    glucose    glucose    hydrALAZINE    insulin aspart U-100    labetalol    ondansetron    oxyCODONE    oxyCODONE    promethazine (PHENERGAN) IVPB    sodium chloride 0.9%    traMADoL        Objective:     Vital Signs (Most Recent):  Temp: 98.1 °F (36.7 °C) (09/04/20 0357)  Pulse: 89 (09/04/20 0357)  Resp: 18 (09/04/20 0357)  BP: 129/74 (09/04/20 0357)  SpO2: 98 % (09/04/20 0357) Vital Signs (24h Range):  Temp:  [97.2 °F (36.2 °C)-98.5 °F (36.9 °C)] 98.1 °F (36.7 °C)  Pulse:  [76-95] 89  Resp:  [10-20] 18  SpO2:  [89 %-100 %] 98 %  BP: (128-192)/(69-96) 129/74     Intake/Output - Last 3 Shifts       09/02 0700 - 09/03 0659 09/03 0700 - 09/04 0659 09/04 0700 - 09/05 0659    I.V. (mL/kg)  3420 (36.5)     IV Piggyback  350     Total Intake(mL/kg)  3770 (40.2)     Urine (mL/kg/hr)  1150 (0.5)     Drains  275     Stool  25     Total Output  1450     Net  +2320            Stool Occurrence  0 x           Physical Exam  Cardiovascular:      Rate and Rhythm: Normal rate.   Pulmonary:      Effort: Pulmonary effort is normal.   Abdominal:      General: There is no distension.      Palpations: Abdomen is soft.      Tenderness:  There is no abdominal tenderness.      Comments: Ostomy pink with bowel sweat in bag   Neurological:      Mental Status: He is alert.         Significant Labs:  BMP:   Recent Labs   Lab 09/04/20  0720   *      K 4.1      CO2 21*   BUN 14   CREATININE 1.1   CALCIUM 8.3*   MG 1.8     CBC:   Recent Labs   Lab 09/04/20  0720   WBC 4.75   RBC 3.88*   HGB 11.8*   HCT 36.8*      MCV 95   MCH 30.4   MCHC 32.1

## 2020-09-05 LAB
POCT GLUCOSE: 101 MG/DL (ref 70–110)
POCT GLUCOSE: 108 MG/DL (ref 70–110)
POCT GLUCOSE: 113 MG/DL (ref 70–110)
POCT GLUCOSE: 132 MG/DL (ref 70–110)

## 2020-09-05 PROCEDURE — 94761 N-INVAS EAR/PLS OXIMETRY MLT: CPT

## 2020-09-05 PROCEDURE — 25000003 PHARM REV CODE 250: Performed by: STUDENT IN AN ORGANIZED HEALTH CARE EDUCATION/TRAINING PROGRAM

## 2020-09-05 PROCEDURE — 97161 PT EVAL LOW COMPLEX 20 MIN: CPT

## 2020-09-05 PROCEDURE — 20600001 HC STEP DOWN PRIVATE ROOM

## 2020-09-05 PROCEDURE — 99900035 HC TECH TIME PER 15 MIN (STAT)

## 2020-09-05 PROCEDURE — 63600175 PHARM REV CODE 636 W HCPCS: Performed by: STUDENT IN AN ORGANIZED HEALTH CARE EDUCATION/TRAINING PROGRAM

## 2020-09-05 RX ADMIN — GABAPENTIN 300 MG: 300 CAPSULE ORAL at 09:09

## 2020-09-05 RX ADMIN — IBUPROFEN 800 MG: 400 TABLET, FILM COATED ORAL at 02:09

## 2020-09-05 RX ADMIN — GABAPENTIN 300 MG: 300 CAPSULE ORAL at 08:09

## 2020-09-05 RX ADMIN — ACETAMINOPHEN 1000 MG: 500 TABLET ORAL at 05:09

## 2020-09-05 RX ADMIN — ACETAMINOPHEN 1000 MG: 500 TABLET ORAL at 09:09

## 2020-09-05 RX ADMIN — IBUPROFEN 800 MG: 400 TABLET, FILM COATED ORAL at 09:09

## 2020-09-05 RX ADMIN — ACETAMINOPHEN 1000 MG: 500 TABLET ORAL at 02:09

## 2020-09-05 RX ADMIN — ENOXAPARIN SODIUM 40 MG: 40 INJECTION SUBCUTANEOUS at 04:09

## 2020-09-05 RX ADMIN — ALVIMOPAN 12 MG: 12 CAPSULE ORAL at 09:09

## 2020-09-05 RX ADMIN — IBUPROFEN 800 MG: 400 TABLET, FILM COATED ORAL at 05:09

## 2020-09-05 RX ADMIN — ALVIMOPAN 12 MG: 12 CAPSULE ORAL at 08:09

## 2020-09-05 RX ADMIN — GABAPENTIN 300 MG: 300 CAPSULE ORAL at 02:09

## 2020-09-05 NOTE — PROGRESS NOTES
[facility]  Colorectal Surgery  Progress Note    Patient Name: Franky Peraza  MRN: [id number]  Admission Date: 9/3/2020  Hospital Length of Stay: 2 days  Attending Physician: ENMANUEL Galvin MD    Subjective:     Interval History: NAEON. Abdomen is distended. Tolerating clear liquids. No ostomy output.    Post-Op Info:  Procedure(s) (LRB):  PROCTECTOMY, PULL-THROUGH, ABDOMINOPERINEAL (N/A)  RESECTION, COLON, LOW ANTERIOR (N/A)  MOBILIZATION, SPLENIC FLEXURE (N/A)  WRAP-OMENTAL (N/A)  SIGMOIDOSCOPY, FLEXIBLE (N/A)   2 Days Post-Op      Medications:  Continuous Infusions:  Scheduled Meds:   acetaminophen  1,000 mg Oral Q8H    alvimopan  12 mg Oral BID    enoxaparin  40 mg Subcutaneous Q24H    gabapentin  300 mg Oral TID    gabapentin  300 mg Oral TID    ibuprofen  800 mg Oral Q8H    mupirocin   Nasal BID     PRN Meds:   dextrose 50%    dextrose 50%    glucagon (human recombinant)    glucose    glucose    hydrALAZINE    insulin aspart U-100    labetalol    ondansetron    oxyCODONE    oxyCODONE    promethazine (PHENERGAN) IVPB    sodium chloride 0.9%    traMADoL        Objective:     Vital Signs (Most Recent):  Temp: 98.3 °F (36.8 °C) (09/05/20 0800)  Pulse: 92 (09/05/20 0800)  Resp: 18 (09/05/20 0800)  BP: (!) 173/92 (09/05/20 0800)  SpO2: 95 % (09/05/20 0800) Vital Signs (24h Range):  Temp:  [97.1 °F (36.2 °C)-99.1 °F (37.3 °C)] 98.3 °F (36.8 °C)  Pulse:  [82-98] 92  Resp:  [16-18] 18  SpO2:  [95 %-97 %] 95 %  BP: (123-173)/(63-92) 173/92     Intake/Output - Last 3 Shifts       09/03 0700 - 09/04 0659 09/04 0700 - 09/05 0659 09/05 0700 - 09/06 0659    P.O.  500     I.V. (mL/kg) 3420 (36.5)      IV Piggyback 350      Total Intake(mL/kg) 3770 (40.2) 500 (5.3)     Urine (mL/kg/hr) 1350 (0.6) 1225 (0.5)     Emesis/NG output  0     Drains 275 360 110    Stool 25 0 300    Total Output 1650 1585 410    Net +2120 -1085 -410           Stool Occurrence 0 x 0 x           Physical  Exam  Cardiovascular:      Rate and Rhythm: Normal rate.   Pulmonary:      Effort: Pulmonary effort is normal.   Abdominal:      General: There is no distension.      Palpations: Abdomen is soft.      Tenderness: There is no abdominal tenderness.      Comments: Ostomy pink with bowel sweat in bag  Drain is ss   Neurological:      Mental Status: He is alert.         Significant Labs:  BMP:   Recent Labs   Lab 09/04/20  0720   *      K 4.1      CO2 21*   BUN 14   CREATININE 1.1   CALCIUM 8.3*   MG 1.8     CBC:   Recent Labs   Lab 09/04/20  0720   WBC 4.75   RBC 3.88*   HGB 11.8*   HCT 36.8*      MCV 95   MCH 30.4   MCHC 32.1     Assessment/Plan:     Rectal cancer  65yo M s/p APR    - CLD  - Flat/erect x-ray today  - PRN pain meds and antiemetics  - DVT ppx  - OOB, ambulate          Ananth Alonso MD  Colorectal Surgery  [facility]

## 2020-09-05 NOTE — SUBJECTIVE & OBJECTIVE
Subjective:     Interval History: NAEON. Abdomen is distended. Tolerating clear liquids. No ostomy output.    Post-Op Info:  Procedure(s) (LRB):  PROCTECTOMY, PULL-THROUGH, ABDOMINOPERINEAL (N/A)  RESECTION, COLON, LOW ANTERIOR (N/A)  MOBILIZATION, SPLENIC FLEXURE (N/A)  WRAP-OMENTAL (N/A)  SIGMOIDOSCOPY, FLEXIBLE (N/A)   2 Days Post-Op      Medications:  Continuous Infusions:  Scheduled Meds:   acetaminophen  1,000 mg Oral Q8H    alvimopan  12 mg Oral BID    enoxaparin  40 mg Subcutaneous Q24H    gabapentin  300 mg Oral TID    gabapentin  300 mg Oral TID    ibuprofen  800 mg Oral Q8H    mupirocin   Nasal BID     PRN Meds:   dextrose 50%    dextrose 50%    glucagon (human recombinant)    glucose    glucose    hydrALAZINE    insulin aspart U-100    labetalol    ondansetron    oxyCODONE    oxyCODONE    promethazine (PHENERGAN) IVPB    sodium chloride 0.9%    traMADoL        Objective:     Vital Signs (Most Recent):  Temp: 98.3 °F (36.8 °C) (09/05/20 0800)  Pulse: 92 (09/05/20 0800)  Resp: 18 (09/05/20 0800)  BP: (!) 173/92 (09/05/20 0800)  SpO2: 95 % (09/05/20 0800) Vital Signs (24h Range):  Temp:  [97.1 °F (36.2 °C)-99.1 °F (37.3 °C)] 98.3 °F (36.8 °C)  Pulse:  [82-98] 92  Resp:  [16-18] 18  SpO2:  [95 %-97 %] 95 %  BP: (123-173)/(63-92) 173/92     Intake/Output - Last 3 Shifts       09/03 0700 - 09/04 0659 09/04 0700 - 09/05 0659 09/05 0700 - 09/06 0659    P.O.  500     I.V. (mL/kg) 3420 (36.5)      IV Piggyback 350      Total Intake(mL/kg) 3770 (40.2) 500 (5.3)     Urine (mL/kg/hr) 1350 (0.6) 1225 (0.5)     Emesis/NG output  0     Drains 275 360 110    Stool 25 0 300    Total Output 1650 1585 410    Net +2120 -1085 -410           Stool Occurrence 0 x 0 x           Physical Exam  Cardiovascular:      Rate and Rhythm: Normal rate.   Pulmonary:      Effort: Pulmonary effort is normal.   Abdominal:      General: There is no distension.      Palpations: Abdomen is soft.      Tenderness: There is  no abdominal tenderness.      Comments: Ostomy pink with bowel sweat in bag  Drain is ss   Neurological:      Mental Status: He is alert.         Significant Labs:  BMP:   Recent Labs   Lab 09/04/20  0720   *      K 4.1      CO2 21*   BUN 14   CREATININE 1.1   CALCIUM 8.3*   MG 1.8     CBC:   Recent Labs   Lab 09/04/20  0720   WBC 4.75   RBC 3.88*   HGB 11.8*   HCT 36.8*      MCV 95   MCH 30.4   MCHC 32.1

## 2020-09-05 NOTE — PLAN OF CARE
Problem: Physical Therapy Goal  Goal: Physical Therapy Goal  Description: Patient independent with transfers and gait, near his functional baseline. He is safe to return home with no therapy needs. Discharge acute PT at this time.   Susanna Dumont, PT  9/5/2020    Outcome: Met

## 2020-09-05 NOTE — ASSESSMENT & PLAN NOTE
65yo M s/p APR    - CLD  - Flat/erect x-ray today  - PRN pain meds and antiemetics  - DVT ppx  - OOB, ambulate

## 2020-09-05 NOTE — PT/OT/SLP EVAL
"Physical Therapy Evaluation and Discharge Note    Patient Name:  Franky Peraza   MRN:  [id number]    Recommendations:     Discharge Recommendations:  home   Discharge Equipment Recommendations: none   Barriers to discharge: None    Assessment:     Franky Peraza is a 64 y.o. male admitted with a medical diagnosis of Rectal cancer. Patient ambulated 200' independent with no AD.  At this time, patient is functioning at their prior level of function and does not require further acute PT services.     Recent Surgery: Procedure(s) (LRB):  PROCTECTOMY, PULL-THROUGH, ABDOMINOPERINEAL (N/A)  RESECTION, COLON, LOW ANTERIOR (N/A)  MOBILIZATION, SPLENIC FLEXURE (N/A)  WRAP-OMENTAL (N/A)  SIGMOIDOSCOPY, FLEXIBLE (N/A) 2 Days Post-Op    Plan:     During this hospitalization, patient does not require further acute PT services.  Please re-consult if situation changes.      Subjective     Chief Complaint: "I've been walking the halls every few hours since the doctors told me I have to"  Patient/Family Comments/goals: return home ASAp  Pain/Comfort:  · Pain Rating 1: (mild abdominal pain, did not rate)  · Pain Addressed 1: Distraction  · Pain Rating Post-Intervention 1: 0/10    Patients cultural, spiritual, Holiness conflicts given the current situation: no    Living Environment:  The patient lives with his wife in a [facility], [address]; WIS built in bench and tub-shower. He does not work, drives. No recent falls.   Prior to admission, patients level of function was independent.  Equipment used at home: none.  DME owned (not currently used): none.  Upon discharge, patient will have assistance from wife.    Objective:     Communicated with RN prior to session.  Patient found HOB elevated with IRAIDA drain, telemetry upon PT entry to room.    General Precautions: Standard, fall   Orthopedic Precautions:N/A   Braces: N/A     Exams:    Cognitive Exam  Patient is A&O x4 and follows 10% of one -step commands    Fine Motor Coordination   -       WFL   "   Postural Exam Patient presented with the following abnormalities:    -       Rounded shoulders  -       Forward head   Sensation    -       Light touch intact AZRA LE   Skin Integrity/Edema     -       Skin integrity: visibly intact  -       Edema: NA   R LE ROM WFL   R LE Strength 5/5 hip flexion, knee ext/flex, and ankle DF/PF   L LE ROM WFL   L LE Strength  5/5 hip flexion, knee ext/flex, and ankle DF/PF       Balance   Static Sitting independent    Dynamic Sitting independent    Static Standing independent    Dynamic Standing       Independent- performed dynamic head turns vertically/horizontally, withstood mild unpredictable dynamic perturbations during gait with no loss of balance          Functional Mobility:    Bed Mobility  Supine to Sit on the R side:  stand by assistance with HOB at 30 deg, use of bed rail- cues for log roll, ed on keeping HOB flat to mimic home- patient refused due to pain   Transfers Sit to Stand:  independent    Gait  Gait Distance: 200 ft with no AD  Assistance Level: independent   Description: reciprocal strides, good bernie, no gross deficits        AM-PAC 6 CLICK MOBILITY  Total Score:24       Therapeutic Activities and Exercises:   Patient safe to ambulate independenty, whiteboard updated.   Patient and wife encouraged for patient to ambulate ad siva, sit up in chair to prevent deconditioning. Verbalized understanding and agreement with therapy.   Patient and spouse educated on role of therapy, goals of session, benefits of out of bed mobility. Patient agreeable to mobilize with therapy.    Discussed PT plan of care during hospitalization. Patient educated that they need to call for assistance to mobilize out of bed. Whiteboard updated as appropriate. Patient educated on how their diagnosis impacts their mobility within PT scope of practice.     AM-PAC 6 CLICK MOBILITY  Total Score:24     Patient left sitting EOB with all lines intact, call button in reach and wife  present.    GOALS:   Multidisciplinary Problems     Physical Therapy Goals     Not on file          Multidisciplinary Problems (Resolved)        Problem: Physical Therapy Goal    Goal Priority Disciplines Outcome Goal Variances Interventions   Physical Therapy Goal   (Resolved)     PT, PT/OT Met     Description: Patient independent with transfers and gait, near his functional baseline. He is safe to return home with no therapy needs. Discharge acute PT at this time.   Susanna Knappjuli, PT  9/5/2020                     History:     Past Medical History:   Diagnosis Date    Diabetes mellitus        Past Surgical History:   Procedure Laterality Date    CATARACT EXTRACTION Bilateral     FLEXIBLE SIGMOIDOSCOPY N/A 8/11/2020    Procedure: SIGMOIDOSCOPY, FLEXIBLE;  Surgeon: ENMANUEL Galvin MD;  Location: [facility] (4TH FLR);  Service: Endoscopy;  Laterality: N/A;  RAPID - Pt lives 2 hours away - ER[facility]    FLEXIBLE SIGMOIDOSCOPY N/A 9/3/2020    Procedure: SIGMOIDOSCOPY, FLEXIBLE;  Surgeon: ENMANUEL Galvin MD;  Location: [facility] OR 2ND FLR;  Service: Colon and Rectal;  Laterality: N/A;    LOW ANTERIOR RESECTION OF COLON N/A 9/3/2020    Procedure: RESECTION, COLON, LOW ANTERIOR;  Surgeon: ENMANUEL Galvin MD;  [facility];  Service: Colon and Rectal;  Laterality: N/A;    MOBILIZATION OF SPLENIC FLEXURE N/A 9/3/2020    Procedure: MOBILIZATION, SPLENIC FLEXURE;  Surgeon: ENMANUEL Galvin MD;  [facility];  Service: Colon and Rectal;  Laterality: N/A;    TONSILLECTOMY         Time Tracking:     PT Received On: 09/05/20  PT Start Time: 1122     PT Stop Time: 1132  PT Total Time (min): 10 min     Billable Minutes: Evaluation 10      Susanna Knappjuli, PT  09/05/2020

## 2020-09-05 NOTE — PLAN OF CARE
POC reviewed w/ pt, verbalized understanding. Pt AAOx4. VSS on RA. No complaint of pain throughout night. ML incision, dermabond; open to air, WDL. LLQ IRAIDA drain serosanguinous. Ileostomy RLQ; WDL rosebud, protruding above skin, moist. Pt up to bathroom independently, w/ adequate UOP overnight.  Pt tolerated clear liquid diet. Pt denies any N/V overnight. Pt slept b/w care. Frequent rounds made for pt safety. Call light in reach. Bed in lowest position and locked. WCTM.

## 2020-09-06 LAB
BODY FLUID SOURCE, CREATININE: NORMAL
CREAT FLD-MCNC: 0.9 MG/DL
CRP SERPL-MCNC: 174.4 MG/L (ref 0–8.2)
POCT GLUCOSE: 116 MG/DL (ref 70–110)

## 2020-09-06 PROCEDURE — 86140 C-REACTIVE PROTEIN: CPT

## 2020-09-06 PROCEDURE — 82570 ASSAY OF URINE CREATININE: CPT

## 2020-09-06 PROCEDURE — 25000003 PHARM REV CODE 250: Performed by: STUDENT IN AN ORGANIZED HEALTH CARE EDUCATION/TRAINING PROGRAM

## 2020-09-06 PROCEDURE — 63600175 PHARM REV CODE 636 W HCPCS: Performed by: STUDENT IN AN ORGANIZED HEALTH CARE EDUCATION/TRAINING PROGRAM

## 2020-09-06 PROCEDURE — 20600001 HC STEP DOWN PRIVATE ROOM

## 2020-09-06 PROCEDURE — 36415 COLL VENOUS BLD VENIPUNCTURE: CPT

## 2020-09-06 RX ADMIN — IBUPROFEN 800 MG: 400 TABLET, FILM COATED ORAL at 05:09

## 2020-09-06 RX ADMIN — IBUPROFEN 800 MG: 400 TABLET, FILM COATED ORAL at 10:09

## 2020-09-06 RX ADMIN — SODIUM CHLORIDE, SODIUM LACTATE, POTASSIUM CHLORIDE, AND CALCIUM CHLORIDE 1000 ML: .6; .31; .03; .02 INJECTION, SOLUTION INTRAVENOUS at 11:09

## 2020-09-06 RX ADMIN — ENOXAPARIN SODIUM 40 MG: 40 INJECTION SUBCUTANEOUS at 05:09

## 2020-09-06 RX ADMIN — ACETAMINOPHEN 1000 MG: 500 TABLET ORAL at 02:09

## 2020-09-06 RX ADMIN — ACETAMINOPHEN 1000 MG: 500 TABLET ORAL at 10:09

## 2020-09-06 RX ADMIN — GABAPENTIN 300 MG: 300 CAPSULE ORAL at 08:09

## 2020-09-06 RX ADMIN — ALVIMOPAN 12 MG: 12 CAPSULE ORAL at 10:09

## 2020-09-06 RX ADMIN — ACETAMINOPHEN 1000 MG: 500 TABLET ORAL at 05:09

## 2020-09-06 RX ADMIN — ALVIMOPAN 12 MG: 12 CAPSULE ORAL at 08:09

## 2020-09-06 RX ADMIN — GABAPENTIN 300 MG: 300 CAPSULE ORAL at 02:09

## 2020-09-06 RX ADMIN — GABAPENTIN 300 MG: 300 CAPSULE ORAL at 10:09

## 2020-09-06 RX ADMIN — IBUPROFEN 800 MG: 400 TABLET, FILM COATED ORAL at 02:09

## 2020-09-06 NOTE — ASSESSMENT & PLAN NOTE
63yo M s/p LAR    - Low res diet  - Trend ostomy output; will give bolus this am  - PRN pain meds and antiemetics  - DVT ppx  - OOB, ambulate

## 2020-09-06 NOTE — PLAN OF CARE
Plan of care reviewed with patient who demonstrated understanding. Pt. AAOx4, VSS on room air. Pt. Does not complain of pain or nausea. IRAIDA drain to left flank intact with large amounts of bloody drainage, stripped per orders. Ileostomy intact putting out moderate amounts of dark liquid output. Pt. Up to the bathroom and throughout the halls independently with no falls or injuries reported. Bed in low position, bed rails x2, call light within reach, frequent monitoring continued.

## 2020-09-06 NOTE — PROGRESS NOTES
[facility]  Colorectal Surgery  Progress Note    Patient Name: Franky Peraza  MRN: [id number]  Admission Date: 9/3/2020  Hospital Length of Stay: 3 days  Attending Physician: ENMANUEL Galvin MD    Subjective:     Interval History: NAEON. Ostomy with 1.7L output. Less distended today. Tolerating diet.    Post-Op Info:  Procedure(s) (LRB):  PROCTECTOMY, PULL-THROUGH, ABDOMINOPERINEAL (N/A)  RESECTION, COLON, LOW ANTERIOR (N/A)  MOBILIZATION, SPLENIC FLEXURE (N/A)  WRAP-OMENTAL (N/A)  SIGMOIDOSCOPY, FLEXIBLE (N/A)   3 Days Post-Op      Medications:  Continuous Infusions:  Scheduled Meds:   acetaminophen  1,000 mg Oral Q8H    alvimopan  12 mg Oral BID    enoxaparin  40 mg Subcutaneous Q24H    gabapentin  300 mg Oral TID    gabapentin  300 mg Oral TID    ibuprofen  800 mg Oral Q8H    mupirocin   Nasal BID     PRN Meds:   dextrose 50%    dextrose 50%    glucagon (human recombinant)    glucose    glucose    hydrALAZINE    insulin aspart U-100    labetalol    ondansetron    oxyCODONE    oxyCODONE    promethazine (PHENERGAN) IVPB    sodium chloride 0.9%    traMADoL        Objective:     Vital Signs (Most Recent):  Temp: 98.5 °F (36.9 °C) (09/06/20 0534)  Pulse: 86 (09/06/20 0534)  Resp: 16 (09/06/20 0534)  BP: (!) 147/85 (09/06/20 0534)  SpO2: 98 % (09/06/20 0534) Vital Signs (24h Range):  Temp:  [97.9 °F (36.6 °C)-99 °F (37.2 °C)] 98.5 °F (36.9 °C)  Pulse:  [84-89] 86  Resp:  [16-18] 16  SpO2:  [96 %-98 %] 98 %  BP: (136-147)/(76-87) 147/85     Intake/Output - Last 3 Shifts       09/04 0700 - 09/05 0659 09/05 0700 - 09/06 0659 09/06 0700 - 09/07 0659    P.O. 500      I.V. (mL/kg)       IV Piggyback       Total Intake(mL/kg) 500 (5.3)      Urine (mL/kg/hr) 1225 (0.5) 650 (0.3) 100 (1.1)    Emesis/NG output 0 0     Drains 360 690 50    Other  0     Stool 0 1725 350    Blood  0     Total Output 1585 3065 500    Net -1085 -3065 -500           Urine Occurrence  3 x     Stool Occurrence 0 x 0  x     Emesis Occurrence  0 x           Physical Exam  Cardiovascular:      Rate and Rhythm: Normal rate.   Pulmonary:      Effort: Pulmonary effort is normal.   Abdominal:      General: There is no distension.      Palpations: Abdomen is soft.      Tenderness: There is no abdominal tenderness.      Comments: Ostomy pink with stool and gas output  Drain is ss   Neurological:      Mental Status: He is alert.         Assessment/Plan:     * Rectal cancer  65yo M s/p LAR    - Low res diet  - Trend ostomy output; will give bolus this am  - PRN pain meds and antiemetics  - DVT ppx  - OOB, ambulate          Ananth Alonso MD  Colorectal Surgery  [facility]

## 2020-09-06 NOTE — SUBJECTIVE & OBJECTIVE
Subjective:     Interval History: NAEON. Ostomy with 1.7L output. Less distended today. Tolerating diet.    Post-Op Info:  Procedure(s) (LRB):  PROCTECTOMY, PULL-THROUGH, ABDOMINOPERINEAL (N/A)  RESECTION, COLON, LOW ANTERIOR (N/A)  MOBILIZATION, SPLENIC FLEXURE (N/A)  WRAP-OMENTAL (N/A)  SIGMOIDOSCOPY, FLEXIBLE (N/A)   3 Days Post-Op      Medications:  Continuous Infusions:  Scheduled Meds:   acetaminophen  1,000 mg Oral Q8H    alvimopan  12 mg Oral BID    enoxaparin  40 mg Subcutaneous Q24H    gabapentin  300 mg Oral TID    gabapentin  300 mg Oral TID    ibuprofen  800 mg Oral Q8H    mupirocin   Nasal BID     PRN Meds:   dextrose 50%    dextrose 50%    glucagon (human recombinant)    glucose    glucose    hydrALAZINE    insulin aspart U-100    labetalol    ondansetron    oxyCODONE    oxyCODONE    promethazine (PHENERGAN) IVPB    sodium chloride 0.9%    traMADoL        Objective:     Vital Signs (Most Recent):  Temp: 98.5 °F (36.9 °C) (09/06/20 0534)  Pulse: 86 (09/06/20 0534)  Resp: 16 (09/06/20 0534)  BP: (!) 147/85 (09/06/20 0534)  SpO2: 98 % (09/06/20 0534) Vital Signs (24h Range):  Temp:  [97.9 °F (36.6 °C)-99 °F (37.2 °C)] 98.5 °F (36.9 °C)  Pulse:  [84-89] 86  Resp:  [16-18] 16  SpO2:  [96 %-98 %] 98 %  BP: (136-147)/(76-87) 147/85     Intake/Output - Last 3 Shifts       09/04 0700 - 09/05 0659 09/05 0700 - 09/06 0659 09/06 0700 - 09/07 0659    P.O. 500      I.V. (mL/kg)       IV Piggyback       Total Intake(mL/kg) 500 (5.3)      Urine (mL/kg/hr) 1225 (0.5) 650 (0.3) 100 (1.1)    Emesis/NG output 0 0     Drains 360 690 50    Other  0     Stool 0 1725 350    Blood  0     Total Output 1585 3065 500    Net -1085 -3065 -500           Urine Occurrence  3 x     Stool Occurrence 0 x 0 x     Emesis Occurrence  0 x           Physical Exam  Cardiovascular:      Rate and Rhythm: Normal rate.   Pulmonary:      Effort: Pulmonary effort is normal.   Abdominal:      General: There is no distension.       Palpations: Abdomen is soft.      Tenderness: There is no abdominal tenderness.      Comments: Ostomy pink with stool and gas output  Drain is ss   Neurological:      Mental Status: He is alert.

## 2020-09-07 VITALS
OXYGEN SATURATION: 99 % | DIASTOLIC BLOOD PRESSURE: 86 MMHG | RESPIRATION RATE: 16 BRPM | BODY MASS INDEX: 28.95 KG/M2 | TEMPERATURE: 97 F | WEIGHT: 206.81 LBS | HEART RATE: 88 BPM | HEIGHT: 71 IN | SYSTOLIC BLOOD PRESSURE: 139 MMHG

## 2020-09-07 LAB
ANION GAP SERPL CALC-SCNC: 13 MMOL/L (ref 8–16)
BASOPHILS # BLD AUTO: 0.04 K/UL (ref 0–0.2)
BASOPHILS NFR BLD: 0.6 % (ref 0–1.9)
BUN SERPL-MCNC: 21 MG/DL (ref 8–23)
CALCIUM SERPL-MCNC: 9.8 MG/DL (ref 8.7–10.5)
CHLORIDE SERPL-SCNC: 103 MMOL/L (ref 95–110)
CO2 SERPL-SCNC: 20 MMOL/L (ref 23–29)
CREAT SERPL-MCNC: 1.1 MG/DL (ref 0.5–1.4)
CRP SERPL-MCNC: 101.5 MG/L (ref 0–8.2)
CRP SERPL-MCNC: 98.93 MG/L (ref 0–3.19)
DIFFERENTIAL METHOD: ABNORMAL
EOSINOPHIL # BLD AUTO: 0.2 K/UL (ref 0–0.5)
EOSINOPHIL NFR BLD: 3.2 % (ref 0–8)
ERYTHROCYTE [DISTWIDTH] IN BLOOD BY AUTOMATED COUNT: 13.6 % (ref 11.5–14.5)
EST. GFR  (AFRICAN AMERICAN): >60 ML/MIN/1.73 M^2
EST. GFR  (NON AFRICAN AMERICAN): >60 ML/MIN/1.73 M^2
GLUCOSE SERPL-MCNC: 148 MG/DL (ref 70–110)
HCT VFR BLD AUTO: 36.5 % (ref 40–54)
HGB BLD-MCNC: 12 G/DL (ref 14–18)
IMM GRANULOCYTES # BLD AUTO: 0.06 K/UL (ref 0–0.04)
IMM GRANULOCYTES NFR BLD AUTO: 1 % (ref 0–0.5)
LYMPHOCYTES # BLD AUTO: 0.4 K/UL (ref 1–4.8)
LYMPHOCYTES NFR BLD: 6.8 % (ref 18–48)
MCH RBC QN AUTO: 30.5 PG (ref 27–31)
MCHC RBC AUTO-ENTMCNC: 32.9 G/DL (ref 32–36)
MCV RBC AUTO: 93 FL (ref 82–98)
MONOCYTES # BLD AUTO: 0.5 K/UL (ref 0.3–1)
MONOCYTES NFR BLD: 8.8 % (ref 4–15)
NEUTROPHILS # BLD AUTO: 4.9 K/UL (ref 1.8–7.7)
NEUTROPHILS NFR BLD: 79.6 % (ref 38–73)
NRBC BLD-RTO: 0 /100 WBC
PLATELET # BLD AUTO: 240 K/UL (ref 150–350)
PMV BLD AUTO: 9.5 FL (ref 9.2–12.9)
POCT GLUCOSE: 145 MG/DL (ref 70–110)
POTASSIUM SERPL-SCNC: 3.8 MMOL/L (ref 3.5–5.1)
RBC # BLD AUTO: 3.94 M/UL (ref 4.6–6.2)
SODIUM SERPL-SCNC: 136 MMOL/L (ref 136–145)
WBC # BLD AUTO: 6.17 K/UL (ref 3.9–12.7)

## 2020-09-07 PROCEDURE — 85025 COMPLETE CBC W/AUTO DIFF WBC: CPT

## 2020-09-07 PROCEDURE — 80048 BASIC METABOLIC PNL TOTAL CA: CPT

## 2020-09-07 PROCEDURE — 25000003 PHARM REV CODE 250: Performed by: STUDENT IN AN ORGANIZED HEALTH CARE EDUCATION/TRAINING PROGRAM

## 2020-09-07 PROCEDURE — 86141 C-REACTIVE PROTEIN HS: CPT

## 2020-09-07 PROCEDURE — 86140 C-REACTIVE PROTEIN: CPT

## 2020-09-07 PROCEDURE — 36415 COLL VENOUS BLD VENIPUNCTURE: CPT

## 2020-09-07 RX ORDER — ACETAMINOPHEN 500 MG
500 TABLET ORAL EVERY 6 HOURS PRN
Qty: 30 TABLET | Refills: 0 | Status: SHIPPED | OUTPATIENT
Start: 2020-09-07

## 2020-09-07 RX ORDER — IBUPROFEN 800 MG/1
800 TABLET ORAL EVERY 8 HOURS PRN
Qty: 30 TABLET | Refills: 0 | Status: SHIPPED | OUTPATIENT
Start: 2020-09-07

## 2020-09-07 RX ORDER — OXYCODONE HYDROCHLORIDE 5 MG/1
5 TABLET ORAL EVERY 4 HOURS PRN
Qty: 30 TABLET | Refills: 0 | Status: SHIPPED | OUTPATIENT
Start: 2020-09-07

## 2020-09-07 RX ORDER — OXYCODONE HYDROCHLORIDE 5 MG/1
5 TABLET ORAL EVERY 4 HOURS PRN
Qty: 30 TABLET | Refills: 0 | Status: SHIPPED | OUTPATIENT
Start: 2020-09-07 | End: 2020-09-07 | Stop reason: HOSPADM

## 2020-09-07 RX ADMIN — IBUPROFEN 800 MG: 400 TABLET, FILM COATED ORAL at 06:09

## 2020-09-07 RX ADMIN — MUPIROCIN: 20 OINTMENT TOPICAL at 09:09

## 2020-09-07 RX ADMIN — ACETAMINOPHEN 1000 MG: 500 TABLET ORAL at 06:09

## 2020-09-07 RX ADMIN — GABAPENTIN 300 MG: 300 CAPSULE ORAL at 09:09

## 2020-09-07 NOTE — SUBJECTIVE & OBJECTIVE
Subjective:     Interval History:     No events overnight. Pain controlled on PO meds. Denies n/v. Voiding.     Post-Op Info:  Procedure(s) (LRB):  PROCTECTOMY, PULL-THROUGH, ABDOMINOPERINEAL (N/A)  RESECTION, COLON, LOW ANTERIOR (N/A)  MOBILIZATION, SPLENIC FLEXURE (N/A)  WRAP-OMENTAL (N/A)  SIGMOIDOSCOPY, FLEXIBLE (N/A)   4 Days Post-Op      Medications:  Continuous Infusions:  Scheduled Meds:   acetaminophen  1,000 mg Oral Q8H    enoxaparin  40 mg Subcutaneous Q24H    gabapentin  300 mg Oral TID    gabapentin  300 mg Oral TID    ibuprofen  800 mg Oral Q8H    mupirocin   Nasal BID     PRN Meds:   dextrose 50%    dextrose 50%    glucagon (human recombinant)    glucose    glucose    insulin aspart U-100    ondansetron    oxyCODONE    oxyCODONE    promethazine (PHENERGAN) IVPB    sodium chloride 0.9%    traMADoL        Objective:     Vital Signs (Most Recent):  Temp: 97.9 °F (36.6 °C) (09/07/20 0402)  Pulse: 80 (09/07/20 0402)  Resp: 18 (09/07/20 0402)  BP: 136/79 (09/07/20 0402)  SpO2: 95 % (09/07/20 0402) Vital Signs (24h Range):  Temp:  [97.7 °F (36.5 °C)-99.3 °F (37.4 °C)] 97.9 °F (36.6 °C)  Pulse:  [75-88] 80  Resp:  [16-20] 18  SpO2:  [95 %-99 %] 95 %  BP: (122-145)/(71-79) 136/79     Intake/Output - Last 3 Shifts       09/05 0700 - 09/06 0659 09/06 0700 - 09/07 0659 09/07 0700 - 09/08 0659    P.O.       Total Intake(mL/kg)       Urine (mL/kg/hr) 650 (0.3) 700 (0.3) 400 (4.6)    Emesis/NG output 0      Drains 690 220 100    Other 0      Stool 1725 1525 300    Blood 0      Total Output 3065 2445 800    Net -3065 -2445 -800           Urine Occurrence 3 x      Stool Occurrence 0 x      Emesis Occurrence 0 x            Physical Exam  Cardiovascular:      Rate and Rhythm: Normal rate.   Pulmonary:      Effort: Pulmonary effort is normal.   Abdominal:      Palpations: Abdomen is soft.      Comments: Soft, appropriately TTP. Incision c/d/i. Ostomy pink with stool and gas output. IRAIDA serosang    Neurological:      Mental Status: He is alert.

## 2020-09-07 NOTE — ASSESSMENT & PLAN NOTE
63yo M s/p LAR    - Low res diet  - Repeat labs this AM  - Decreasing ostomy output (1175 yesterday)  - PRN pain meds and antiemetics  - LNX DVT ppx  - OOB, ambulate  - D/C later today with IRAIDA drain; will follow up Friday to have drain removed. Needs drain teaching prior to discharge

## 2020-09-07 NOTE — DISCHARGE SUMMARY
[facility]  DISCHARGE SUMMARY  General Surgery      Admit Date:  9/3/2020    Discharge Date and Time:  9/7/2020  12:00 PM    Attending Physician:  ENMANUEL Galvin MD     Discharge Provider:  Sirisha Loza MD     Reason for Admission:  Rectal cancer     Procedures Performed:  Procedure(s) (LRB):  PROCTECTOMY, PULL-THROUGH, ABDOMINOPERINEAL (N/A)  RESECTION, COLON, LOW ANTERIOR (N/A)  MOBILIZATION, SPLENIC FLEXURE (N/A)  WRAP-OMENTAL (N/A)  SIGMOIDOSCOPY, FLEXIBLE (N/A)    Hospital Course:  Please see the preoperative H&P and other available documentation for full details related to history prior to this admission.  Briefly, Franky Peraza is a 64 y.o. male who was admitted following scheduled elective surgery for Rectal cancer    Following a complete preoperative discussion of the risks and benefits of surgery with signed informed consent, the patient was taken to the operating room on 9/3/2020 and underwent the above stated procedures.  The patient tolerated surgery well and there were no complications.  Please see the operative report for full intraoperative findings and details.  Postoperatively, the patient did well and was transferred from the PACU to the floor in stable condition where they had a stable and uncomplicated hospital course.  Labs and vital signs remained stable and appropriate throughout course.  Diet was advanced as tolerated and the patient's pain was controlled on oral pain medications without problem.  Currently, the patient is doing well at 4 Days Post-Op and is stable and appropriate for discharge home at this time.      Consults:  None.    Significant Diagnostic Studies:   Recent Labs   Lab 09/07/20  0832   WBC 6.17   HGB 12.0*   HCT 36.5*        Recent Labs   Lab 09/06/20  0301 09/07/20  0526 09/07/20  0832   NA  --   --  136   K  --   --  3.8   CL  --   --  103   CO2  --   --  20*   BUN  --   --  21   CREATININE  --   --  1.1   GLU  --   --  148*   CALCIUM   --   --  9.8   .4* 101.5*  --    HSCRP  --   --  98.93*   No results for input(s): INR, PTT, LABHEPA, LACTATE, TROPONINI, CPK, CPKMB, MB, BNP in the last 72 hours.No results for input(s): PH, PCO2, PO2, HCO3 in the last 72 hours.      Final Diagnoses:   Principal Problem:  Rectal cancer   Secondary Diagnoses:    Active Hospital Problems    Diagnosis  POA    *Rectal cancer [C20]  Yes      Resolved Hospital Problems   No resolved problems to display.       Discharged Condition:  Good    Disposition:  Home or Self Care    Follow Up/Patient Instructions:     Medications:  Reconciled Home Medications:    Current Discharge Medication List      START taking these medications    Details   acetaminophen (TYLENOL) 500 MG tablet Take 1 tablet (500 mg total) by mouth every 6 (six) hours as needed for Pain.  Qty: 30 tablet, Refills: 0      ibuprofen (ADVIL,MOTRIN) 800 MG tablet Take 1 tablet (800 mg total) by mouth every 8 (eight) hours as needed for Other (for pain).  Qty: 30 tablet, Refills: 0      oxyCODONE (ROXICODONE) 5 MG immediate release tablet Take 1 tablet (5 mg total) by mouth every 4 (four) hours as needed for Pain.  Qty: 30 tablet, Refills: 0    Comments: n/a          CONTINUE these medications which have NOT CHANGED    Details   allopurinol (ZYLOPRIM) 100 MG tablet Take 100 mg by mouth once daily.      metFORMIN (GLUCOPHAGE) 500 MG tablet Take 500 mg by mouth 2 (two) times daily with meals.         STOP taking these medications       lisinopril-hydrochlorothiazide (PRINZIDE,ZESTORETIC) 20-12.5 mg per tablet Comments:   Reason for Stopping:         metroNIDAZOLE (FLAGYL) 500 MG tablet Comments:   Reason for Stopping:         neomycin (MYCIFRADIN) 500 mg Tab Comments:   Reason for Stopping:         polyethylene glycol (GLYCOLAX) 17 gram/dose powder Comments:   Reason for Stopping:             Discharge Procedure Orders   Lifting restrictions   Order Comments: Please lift no more than 5lbs for 6 weeks.  Reassess at follow up clinic visit     No driving until:   Order Comments: No driving while taking opioid pain medication     Notify your health care provider if you experience any of the following:  temperature >100.4     Notify your health care provider if you experience any of the following:  persistent nausea and vomiting or diarrhea     Notify your health care provider if you experience any of the following:  severe uncontrolled pain     Notify your health care provider if you experience any of the following:  redness, tenderness, or signs of infection (pain, swelling, redness, odor or green/yellow discharge around incision site)     Notify your health care provider if you experience any of the following:  difficulty breathing or increased cough     Notify your health care provider if you experience any of the following:  severe persistent headache     Notify your health care provider if you experience any of the following:  worsening rash     Notify your health care provider if you experience any of the following:  persistent dizziness, light-headedness, or visual disturbances     Notify your health care provider if you experience any of the following:  increased confusion or weakness     Notify your health care provider if you experience any of the following:     Activity as tolerated     Shower on day dressing removed (No bath)   Order Comments: Gentle shower. No submerging or soaking in water for 6 weeks. Reassess at follow up visit     Follow-up Information     [facility].    Contact information:  [address]  [place] [address] [phone]              Odin Galvin MD. Schedule an appointment as soon as possible for a visit on 9/15/2020.    Specialty: Colon and Rectal Surgery  Contact information:  [address] [facility] [address]  [phone]                   Sirisha Loza MD

## 2020-09-07 NOTE — PROGRESS NOTES
[facility]  Colorectal Surgery  Progress Note    Patient Name: Franky Peraza  MRN: [id number]  Admission Date: 9/3/2020  Hospital Length of Stay: 4 days  Attending Physician: ENMANUEL Galvin MD    Subjective:     Interval History:     No events overnight. Pain controlled on PO meds. Denies n/v. Voiding.     Post-Op Info:  Procedure(s) (LRB):  PROCTECTOMY, PULL-THROUGH, ABDOMINOPERINEAL (N/A)  RESECTION, COLON, LOW ANTERIOR (N/A)  MOBILIZATION, SPLENIC FLEXURE (N/A)  WRAP-OMENTAL (N/A)  SIGMOIDOSCOPY, FLEXIBLE (N/A)   4 Days Post-Op      Medications:  Continuous Infusions:  Scheduled Meds:   acetaminophen  1,000 mg Oral Q8H    enoxaparin  40 mg Subcutaneous Q24H    gabapentin  300 mg Oral TID    gabapentin  300 mg Oral TID    ibuprofen  800 mg Oral Q8H    mupirocin   Nasal BID     PRN Meds:   dextrose 50%    dextrose 50%    glucagon (human recombinant)    glucose    glucose    insulin aspart U-100    ondansetron    oxyCODONE    oxyCODONE    promethazine (PHENERGAN) IVPB    sodium chloride 0.9%    traMADoL        Objective:     Vital Signs (Most Recent):  Temp: 97.9 °F (36.6 °C) (09/07/20 0402)  Pulse: 80 (09/07/20 0402)  Resp: 18 (09/07/20 0402)  BP: 136/79 (09/07/20 0402)  SpO2: 95 % (09/07/20 0402) Vital Signs (24h Range):  Temp:  [97.7 °F (36.5 °C)-99.3 °F (37.4 °C)] 97.9 °F (36.6 °C)  Pulse:  [75-88] 80  Resp:  [16-20] 18  SpO2:  [95 %-99 %] 95 %  BP: (122-145)/(71-79) 136/79     Intake/Output - Last 3 Shifts       09/05 0700 - 09/06 0659 09/06 0700 - 09/07 0659 09/07 0700 - 09/08 0659    P.O.       Total Intake(mL/kg)       Urine (mL/kg/hr) 650 (0.3) 700 (0.3) 400 (4.6)    Emesis/NG output 0      Drains 690 220 100    Other 0      Stool 1725 1525 300    Blood 0      Total Output 3065 2445 800    Net -3065 -2442 -800           Urine Occurrence 3 x      Stool Occurrence 0 x      Emesis Occurrence 0 x            Physical Exam  Cardiovascular:      Rate and Rhythm: Normal rate.    Pulmonary:      Effort: Pulmonary effort is normal.   Abdominal:      Palpations: Abdomen is soft.      Comments: Soft, appropriately TTP. Incision c/d/i. Ostomy pink with stool and gas output. IRAIDA serosang   Neurological:      Mental Status: He is alert.         Assessment/Plan:     * Rectal cancer  65yo M s/p LAR    - Low res diet  - Repeat labs this AM  - Decreasing ostomy output (1175 yesterday)  - PRN pain meds and antiemetics  - LNX DVT ppx  - OOB, ambulate  - D/C later today with IRAIDA drain; will follow up Friday to have drain removed. Needs drain teaching prior to discharge          Elder Meza MD  Colorectal Surgery  [facility]

## 2020-09-07 NOTE — PLAN OF CARE
Plan of care reviewed with patient who demonstrated understanding. Pt. AAOx4, VSS on room air. Pt. Does not complain of any nausea or pain. Pt. Ambulating within the halls independently. IRAIDA drain to left abdomen intact draining small amounts of serosanguinous fluid. Ileostomy intact with small to moderate amounts of thin liquidy output, pt. Emptying ostomy independently. No falls or injuries reported, bed in low position, bed rails x2, call light within reach, frequent monitoring continued.

## 2020-09-08 ENCOUNTER — TELEPHONE (OUTPATIENT)
Dept: SURGERY | Facility: CLINIC | Age: 65
End: 2020-09-08

## 2020-09-08 NOTE — TELEPHONE ENCOUNTER
----- Message from Sirisha Loza MD sent at 9/7/2020 12:18 PM CDT -----  Regarding: follow up  Please call patient to schedule follow up appointment with Dr. Galvin on next Tuesday 9/15

## 2020-09-08 NOTE — PLAN OF CARE
Patient was discharged home with no needs.      09/08/20 1543   Final Note   Assessment Type Final Discharge Note   Anticipated Discharge Disposition Home   Hospital Follow Up  Appt(s) scheduled? Yes   Discharge plans and expectations educations in teach back method with documentation complete? Yes   Right Care Referral Info   Post Acute Recommendation No Care   Post-Acute Status   Post-Acute Authorization Other   Other Status No Post-Acute Service Needs     Future Appointments   Date Time Provider Department Center   9/15/2020  1:40 PM ENMANUEL Galvin MD [facility]     Aislinn Duarte RN, CM   Ext: [phone]

## 2020-09-08 NOTE — NURSING
Patient and wife given discharge instructions, follow up appt information, med reconciliation.  Both verbalized understanding.  Wife going down to  prescriptions from pharmacy.  Patient escorted off unit via wheelchair by transport.

## 2020-09-08 NOTE — TELEPHONE ENCOUNTER
Tried to call pt to let him know about his f/u appt with Dr Galvin. N/A and unable to leave a message. Will send message on portal.

## 2020-09-08 NOTE — TELEPHONE ENCOUNTER
Pt called in with his total output. I will give the numbers to Dr Galvin and get back with him. Gave him his appt date and time. Told him I will call him back with what Dr Galvin says.

## 2020-09-08 NOTE — TELEPHONE ENCOUNTER
Was going to leave a message on pt's portal about appt with Dr Galvin but he does not have it set up it is still pending.

## 2020-09-08 NOTE — TELEPHONE ENCOUNTER
----- Message from Colleen Burkett sent at 9/8/2020 10:16 AM CDT -----  Regarding: Liquids intake  Contact: pt  Reason:Pt says he is calling to report the amount of liquid intake He was told to report this. He was discharged from the Hospital on yesterday    Communication: [phone]

## 2020-09-09 ENCOUNTER — TELEPHONE (OUTPATIENT)
Dept: WOUND CARE | Facility: CLINIC | Age: 65
End: 2020-09-09

## 2020-09-09 NOTE — TELEPHONE ENCOUNTER
----- Message from Stephani Gayle sent at 9/9/2020  8:03 AM CDT -----  Regarding: PT  Contact: Pt's Wife  PT's wife called to speak to Katlin about the PT. He was discharged yesterday and she told them to give her a call if they have any questions and she has a few. Please call back     Callback: [phone]

## 2020-09-09 NOTE — TELEPHONE ENCOUNTER
Pt just got home and is having leaking multiple times a day already. I have no photo of ostomy but is a loop ileostomy, reviewed steps to application , they apparently are not letting the cavilon dry before placement . Reviewed cutting, diet, and supplies,  They are coming next week for appt with Kamar, I will plan to see him that day as well,.  Also spoke to wife and she thinks after discussion she knows what they have done wrong,  Will call back for any problems

## 2020-09-11 ENCOUNTER — TELEPHONE (OUTPATIENT)
Dept: SURGERY | Facility: CLINIC | Age: 65
End: 2020-09-11

## 2020-09-11 ENCOUNTER — PATIENT OUTREACH (OUTPATIENT)
Dept: ADMINISTRATIVE | Facility: CLINIC | Age: 65
End: 2020-09-11

## 2020-09-11 NOTE — TELEPHONE ENCOUNTER
----- Message from Glenna Lopes LPN sent at 9/11/2020  1:21 PM CDT -----  Spoke with patient and wife, states that patient is not able to keep bag on skin. States that they think it may be becoming irritated from constantly changing the bag. Also requesting a wheelchair. States that patient can walk short distances, but needs a wheelchair to go any length. Please contact patient to discuss.       Respectfully,  Glenna Lopes LPN  [facility]    [email or web address]       Please do not reply to this message, as this inbox is not routinely monitored.

## 2020-09-11 NOTE — PATIENT INSTRUCTIONS
Ileostomy: Caring for Your Stoma     Applying an extra skin barrier, such as a wipe, helps protect the skin if stool and digestive juices leak around the pouch. Wipe it in a [place] around the stoma. Then let it dry for 1 minute before putting on a new pouch.     You need to take care of your stoma and the skin around it (peristomal skin). That means keeping the stoma and the skin clean. It also means protecting the skin from moisture and contact with stool. This helps prevent skin problems and odor.  Check the stoma  Check your stoma and the skin around it each time you change your pouch.  front of a mirror, or use a hand mirror so that you can see all the way around the stoma. It should look shiny, moist, and dark pink or red. The skin around it should be smooth, with no red or broken spots.  Clean around the stoma  Clean around the stoma with warm water and a soft washcloth each time you change the pouch. Water does not harm the stoma:  · There are no nerves in the stoma, so there is no feeling. Be sure to clean and dry the stoma gently. You could injure the stoma without knowing it.  · The stoma may bleed a little when you clean it. Thats because there are tiny blood vessels in the tissue.  Protect the skin around the stoma  For the pouch to stick well, the skin around the stoma needs to be dry and smooth. If the skin is moist or uneven, the pouch is more likely to leak. A leaky pouch will irritate the skin. Thats because digestive juices break down skin just as they break down food. A leaky pouch can also cause odor:  · To help keep the skin healthy, pat it dry after you wash it.  · If you like, apply an extra skin barrier, such as a wipe, before you put on a new pouch. This helps protect the skin if stool and digestive juices leak around the pouch.  Common causes of skin problems  These include the following:  · A leaking pouch can make the skin red and weepy. Use a measuring guide to check that the  opening on the pouch is the correct size.  · Hair under the pouch can make the skin inflamed. To avoid this, shave off any hair around the stoma with an electric razor. Always shave away from the stoma.  · Allergies to skin barriers can make the skin itch, burn, or sting. You may need to try a new skin barrier or change to a new kind of pouch.  · Yeast infections can make the skin red and itchy. Sweat under the pouch makes these infections more likely. A pouch cover can help keep the skin dry.     Call your enterostomal therapy (ET) nurse or other healthcare provider  Contact your healthcare provider if:  · The skin around the stoma is red, weepy, bleeding, or broken.  · The skin around the stoma itches, burns, stings, or has white spots.  · The stoma swells, changes color, or bleeds without stopping.  · The stoma becomes even with or sinks below the skin, or it sticks up more than normal.   Date Last Reviewed: 8/1/2016 © 2000-2020 [employer]. [address] [place] [address]. All rights reserved. This information is not intended as a substitute for professional medical care. Always follow your healthcare professional's instructions.        Sepsis     To treat sepsis, antibiotics and fluids may by given through an intravenous (IV) line.     Sepsis happens when your body responds with widespread inflammation to a bad infection or bacteremia--the presence of bacteria in your bloodstream. Sepsis can be deadly. Blood pressure may drop and the lungs and kidneys may start to fail. Emergency care for sepsis is crucial.  Risk factors  Those most at risk for sepsis are:  · Infants or older adults  · People who have an illness that weakens their immune system, such as cancer, AIDS, or diabetes  · People being treated with chemotherapy medicines or radiation, which weakens the immune system  · People who have had a transplant  · People with a very severe infection such as pneumonia, meningitis, or a  urinary tract infection  When to go to the emergency department (ED)  Sepsis is an emergency. Go to the nearest ED if you have a fever with any of these symptoms:  · Chills and shaking  · Rapid heartbeat and breathing  · Trouble breathing  · Severe nausea or uncontrolled vomiting  · Confusion, disorientation, drowsiness, or dizziness  · Decreased urination  · Severe pain, including in the back or joints   What to expect in the ED  · Blood and urine tests are done to look for bacteria. They also check for organ failure.  · Blood, urine, or sputum cultures may be taken. The samples are sent to a lab. They are placed in a special container. Any bacteria should grow in 24 hours.  · X-rays or other imaging tests may be done.  A person with sepsis will be admitted to the hospital and treated with antibiotics. Treatment may also include oxygen and intravenous fluids.  Date Last Reviewed: 10/1/2016  © 4345-8955 [employer]. [address] [place] [address], [place] [address]. All rights reserved. This information is not intended as a substitute for professional medical care. Always follow your healthcare professional's instructions.

## 2020-09-11 NOTE — TELEPHONE ENCOUNTER
Called pt to let him know I made an appt for him to see Katlin the same day he sees Dr Galvin. He wanted to know if he should continue the imodium. Told him to continue taking 2 in the am and 2 in the pm. He will need to contact his primary doctor about a wheel chair.

## 2020-09-11 NOTE — PROGRESS NOTES
Patient and wife states that they are having a hard time keeping the bag over the stoma. Also requesting an order for a wheelchair. Order sent to Dr. Galvin.

## 2020-09-14 ENCOUNTER — TELEPHONE (OUTPATIENT)
Dept: SURGERY | Facility: CLINIC | Age: 65
End: 2020-09-14

## 2020-09-14 NOTE — TELEPHONE ENCOUNTER
Spoke with patient regarding an appointment with Dr. Vergara.  He wanted to make sure that we were going to be here.  Informed him that as of now we are going to be here.  He is going to call in the morning to verify that we are here and whether he will be able to make it or not.

## 2020-09-15 ENCOUNTER — OFFICE VISIT (OUTPATIENT)
Dept: SURGERY | Facility: CLINIC | Age: 65
End: 2020-09-15
Payer: MEDICARE

## 2020-09-15 ENCOUNTER — TELEPHONE (OUTPATIENT)
Dept: SURGERY | Facility: CLINIC | Age: 65
End: 2020-09-15

## 2020-09-15 ENCOUNTER — OFFICE VISIT (OUTPATIENT)
Dept: WOUND CARE | Facility: CLINIC | Age: 65
End: 2020-09-15
Payer: MEDICARE

## 2020-09-15 VITALS
DIASTOLIC BLOOD PRESSURE: 74 MMHG | HEIGHT: 71 IN | HEART RATE: 81 BPM | SYSTOLIC BLOOD PRESSURE: 143 MMHG | WEIGHT: 196.19 LBS | BODY MASS INDEX: 27.47 KG/M2

## 2020-09-15 DIAGNOSIS — Z85.048 HISTORY OF RECTAL CANCER: Primary | ICD-10-CM

## 2020-09-15 DIAGNOSIS — C20 RECTAL CANCER: Primary | ICD-10-CM

## 2020-09-15 DIAGNOSIS — Z43.2 ATTENTION TO ILEOSTOMY: Primary | ICD-10-CM

## 2020-09-15 DIAGNOSIS — Z71.89 ENCOUNTER FOR OSTOMY CARE EDUCATION: ICD-10-CM

## 2020-09-15 PROCEDURE — 99024 PR POST-OP FOLLOW-UP VISIT: ICD-10-PCS | Mod: S$GLB,,, | Performed by: CLINICAL NURSE SPECIALIST

## 2020-09-15 PROCEDURE — 99999 PR PBB SHADOW E&M-EST. PATIENT-LVL III: ICD-10-PCS | Mod: PBBFAC,,, | Performed by: COLON & RECTAL SURGERY

## 2020-09-15 PROCEDURE — 99999 PR PBB SHADOW E&M-EST. PATIENT-LVL II: ICD-10-PCS | Mod: PBBFAC,,, | Performed by: CLINICAL NURSE SPECIALIST

## 2020-09-15 PROCEDURE — 99024 PR POST-OP FOLLOW-UP VISIT: ICD-10-PCS | Mod: S$GLB,,, | Performed by: COLON & RECTAL SURGERY

## 2020-09-15 PROCEDURE — 99999 PR PBB SHADOW E&M-EST. PATIENT-LVL II: CPT | Mod: PBBFAC,,, | Performed by: CLINICAL NURSE SPECIALIST

## 2020-09-15 PROCEDURE — 99024 POSTOP FOLLOW-UP VISIT: CPT | Mod: S$GLB,,, | Performed by: COLON & RECTAL SURGERY

## 2020-09-15 PROCEDURE — 99999 PR PBB SHADOW E&M-EST. PATIENT-LVL III: CPT | Mod: PBBFAC,,, | Performed by: COLON & RECTAL SURGERY

## 2020-09-15 PROCEDURE — 99024 POSTOP FOLLOW-UP VISIT: CPT | Mod: S$GLB,,, | Performed by: CLINICAL NURSE SPECIALIST

## 2020-09-15 NOTE — TELEPHONE ENCOUNTER
Called pt to let him know he needs to increase his fluids. Lab today showed he is dehydrated. Pt voiced understanding.

## 2020-09-15 NOTE — LETTER
September 15, 2020        Krystle Staples MD  [address] [facility]  [place] [address]             [facility] 4th Fl  [address]  [place] [address]  Phone: [phone]   Patient: Franky Peraza   MR Number: [id number]   YOB: 1955   Date of Visit: 9/15/2020       Dear Dr. Staples:    Thank you for referring Franky Peraza to me for evaluation. Attached you will find relevant portions of my assessment and plan of care.    In summary, Mr Peraza is doing very well from his recent ultra low anterior resection with intersphincteric resection and hand-sewn colonic J pouch anal anastomosis with diverting loop ileostomy.  His wound is healing primarily and stoma functioning well.  We are still awaiting the final pathology and I will certainly keep you informed once that is available.      I hope that if he continues to do well we will be able to close his ileostomy in early December.      If you have questions, please do not hesitate to call me. I look forward to following Franky Peraza along with you.    Sincerely,      ENMANUEL Galvin MD            CC  No Recipients    Enclosure

## 2020-09-15 NOTE — PROGRESS NOTES
HPI:  Franky Peraza is a 64 y.o. male s/p total neoadjuvant RX (long course CXRT followed by 8 cycles FOLFOX) LAR intersphincteric resection, colonic J pouch anal anastomosis with DLI 9-3-2020.      Interval hx:     Pt feels well.  Appetite improving.  No N/V.  No fever.  Stoma output < 800/ day.  No problems with wound  Had pouching problems but bag now on for 5 days.          Past Medical History:   Diagnosis Date    Diabetes mellitus         Past Surgical History:   Procedure Laterality Date    CATARACT EXTRACTION Bilateral     FLEXIBLE SIGMOIDOSCOPY N/A 8/11/2020    Procedure: SIGMOIDOSCOPY, FLEXIBLE;  Surgeon: ENMANUEL Galvin MD;  Location: [facility] (4TH FLR);  Service: Endoscopy;  Laterality: N/A;  RAPID - Pt lives 2 hours away - ERW    FLEXIBLE SIGMOIDOSCOPY N/A 9/3/2020    Procedure: SIGMOIDOSCOPY, FLEXIBLE;  Surgeon: ENMANUEL Galvin MD;  Location: [facility] OR 2ND FLR;  Service: Colon and Rectal;  Laterality: N/A;    LOW ANTERIOR RESECTION OF COLON N/A 9/3/2020    Procedure: RESECTION, COLON, LOW ANTERIOR;  Surgeon: ENMANUEL Galvin MD;  [facility];  Service: Colon and Rectal;  Laterality: N/A;    MOBILIZATION OF SPLENIC FLEXURE N/A 9/3/2020    Procedure: MOBILIZATION, SPLENIC FLEXURE;  Surgeon: ENMANUEL Galvin MD;  Location: [facility] OR 2ND FLR;  Service: Colon and Rectal;  Laterality: N/A;    TONSILLECTOMY         Review of patient's allergies indicates:  No Known Allergies    Family History   Problem Relation Age of Onset    Cancer Mother     Cancer Maternal Uncle        Social History     Socioeconomic History    Marital status:      Spouse name: Not on file    Number of children: Not on file    Years of education: Not on file    Highest education level: Not on file   Occupational History    Not on file   Social Needs    Financial resource strain: Not on file    Food insecurity     Worry: Not on file     Inability: Not on file    Transportation needs     Medical: Not on  "file     Non-medical: Not on file   Tobacco Use    Smoking status: Never Smoker    Smokeless tobacco: Never Used   Substance and Sexual Activity    Alcohol use: Never     Frequency: Never    Drug use: Never    Sexual activity: Yes   Lifestyle    Physical activity     Days per week: Not on file     Minutes per session: Not on file    Stress: Not on file   Relationships    Social connections     Talks on phone: Not on file     Gets together: Not on file     Attends Taoism service: Not on file     Active member of club or organization: Not on file     Attends meetings of clubs or organizations: Not on file     Relationship status: Not on file   Other Topics Concern    Not on file   Social History Narrative    Not on file       ROS:  GENERAL: No fever, chills, fatigability or weight loss.  Integument: No rashes, redness, icterus  CHEST: Denies MCGARRY, cyanosis, wheezing, cough and sputum production.  CARDIOVASCULAR: Denies chest pain, PND, orthopnea or reduced exercise tolerance.  GI: Denies abd pain, dysphagia, nausea, vomiting, no hematemesis   : Denies burning on urination, no hematuria, no bacteriuria  MSK: No deformities, swelling, joint pain swelling  Neurologic: No HAs, seizures, weakness, paresthesias, gait problems    PE:  General appearance well  BP (!) 143/74 (BP Location: Left arm, Patient Position: Sitting, BP Method: Large (Automatic))   Pulse 81   Ht 5' 11" (1.803 m)   Wt 89 kg (196 lb 3.4 oz)   BMI 27.37 kg/m²     Sclera/ Skin anicteric  AT NC EOMI  Neck supple trachea midline   Chest symmetric, nl excursion, no retractions, breathing comfortably  Abdomen        ND soft NT.  no masses, no organomegaly  EXT - no CCE  Neuro:  Mood/ affect nl, alert and oriented x 3, moves all ext's, gait nl      Assessment:  Wound healed.    Stoma output stabilizing with Imodium  Drain output < 50 per day    Plan:  D/c drain  Awaiting pathology  Flex sig and CT scan in 6 weeks  OV 6 weeks to schedule stoma " closure

## 2020-09-15 NOTE — LETTER
September 15, 2020        Krystle Staples MD  [address] [facility]  [place] [address]             [facility] 4th Fl  [address]  [place] [address]  Phone: [phone]   Patient: Franky Peraza   MR Number: [id number]   YOB: 1955   Date of Visit: 9/15/2020       Dear Dr. Staples:    Thank you for referring Franky Peraza to me for evaluation. Attached you will find relevant portions of my assessment and plan of care.    If you have questions, please do not hesitate to call me. I look forward to following Franky Peraza along with you.    Sincerely,      ENMANUEL Galvin MD            CC  No Recipients    Enclosure

## 2020-09-15 NOTE — PROGRESS NOTES
This patient is known to me and is here in clinic today for first post op evaluation related to ileostomy. Surgery done few weeks ago by Dr. Galvin. The patient reports some problems with  new ostomy but since our phone discussion 5 days ago all has been fine and no leaks . The patient is not receiving home health care .   Pain level today is reported as  0.    The ileostomy is 30 mm michael medium budded stoma.  The patient is currently  wearing a 1piece pouching system by [employer].   Average wear time is 5 days. NOW with convex  Peristomal skin is rashy , looks like folliculitis    There is no  mucocutaneous separation.  Pt is coping well with the new ostomy.  SUPPLIES/DME: sent to [employer] today   Patient enrolled in [employer] Care Program. Pt gives verbal permission and understanding a representative will call them regarding samples sent and follow up needs.  Samples requested today based on needs or patient requests today.    Pouching concerns include:    30mm  Patient instructions for pouching:  Cleanse skin with water, dry well.  Apply no sting skin barrier film . Allow to dry  Add antifungal powder if rash does not resolve in few days  Apply  pouch sized appropriately for stoma. Reviewed sizing of new stoma  BAKARI convex light or soft   Added belt and pt likes this alot  SPECIAL NEEDS:  Pt counseled on skin care, nutrition, hydration as well as  how to order ostomy supplies.  I spent greater than 50% of this 45 minute visit in face to face counseling.

## 2020-09-15 NOTE — LETTER
September 15, 2020        JANNETTE Viramontes MD  [address]  [place] [address]             [facility] 4[facility]  [address]  [facility] [address]  Phone: [phone]   Patient: Franky Peraza   MR Number: [id number]   YOB: 1955   Date of Visit: 9/15/2020       Dear Dr. Viramontes:    Thank you for referring Franky Peraza to me for evaluation. Attached you will find relevant portions of my assessment and plan of care.    In summary, Mr Peraza is doing very well from his recent ultra low anterior resection with intersphincteric resection and hand-sewn colonic J pouch anal anastomosis with diverting loop ileostomy.  His wound is healing primarily and stoma functioning well.  We are still awaiting the final pathology and I will certainly keep you informed once that is available.      I hope that if he continues to do well we will be able to close his ileostomy in early December.      If you have questions, please do not hesitate to call me. I look forward to following Franky Peraza along with you.    Sincerely,      ENMANUEL Galvin MD            CC  No Recipients    Enclosure

## 2020-09-16 ENCOUNTER — PATIENT MESSAGE (OUTPATIENT)
Dept: SURGERY | Facility: CLINIC | Age: 65
End: 2020-09-16

## 2020-09-17 ENCOUNTER — PATIENT MESSAGE (OUTPATIENT)
Dept: SURGERY | Facility: CLINIC | Age: 65
End: 2020-09-17

## 2020-09-17 LAB
FINAL PATHOLOGIC DIAGNOSIS: NORMAL
FROZEN SECTION DIAGNOSIS: NORMAL
FROZEN SECTION FOOTNOTE: NORMAL
GROSS: NORMAL

## 2020-09-20 ENCOUNTER — PATIENT MESSAGE (OUTPATIENT)
Dept: SURGERY | Facility: CLINIC | Age: 65
End: 2020-09-20

## 2020-10-12 ENCOUNTER — PATIENT MESSAGE (OUTPATIENT)
Dept: SURGERY | Facility: CLINIC | Age: 65
End: 2020-10-12

## 2020-10-16 ENCOUNTER — PATIENT MESSAGE (OUTPATIENT)
Dept: SURGERY | Facility: CLINIC | Age: 65
End: 2020-10-16

## 2020-10-20 ENCOUNTER — TELEPHONE (OUTPATIENT)
Dept: SURGERY | Facility: CLINIC | Age: 65
End: 2020-10-20

## 2020-10-20 NOTE — TELEPHONE ENCOUNTER
----- Message from Nathalie Castellon sent at 10/20/2020  1:04 PM CDT -----  Mina young/[facility] is requesting a copy of pt pathology report for 9/3/20. Call back [phone] and fax # [phone]

## 2020-10-22 ENCOUNTER — PATIENT MESSAGE (OUTPATIENT)
Dept: SURGERY | Facility: CLINIC | Age: 65
End: 2020-10-22

## 2020-10-27 ENCOUNTER — PATIENT MESSAGE (OUTPATIENT)
Dept: SURGERY | Facility: CLINIC | Age: 65
End: 2020-10-27

## 2020-11-03 NOTE — PROGRESS NOTES
HPI:  Franky Peraza is a 65 y.o. male with history of low rectal CA     9-3-2020 ultra LAR with ISR CJPAA and DLI    Pathology  1. Anal canal, rectum and sigmoid (low anterior resection):  Invasive adenocarcinoma, moderately and poorly differentiated  See cancer synoptic report below  2. Radial margin, anterior anal canal (FS, excision):  Negative for malignancy  Benign fibro connective and adipose tissue  3. Lateral radial margin, anal canal (FS, excision):  Negative for malignancy  Benign fibroconnective and adipose tissue  4. Anal canal, stitch at new distal resection margin (FS, excision):  Negative for malignancy  Benign squamous mucosa  Cancer synoptic report  Procedure: Low anterior resection  Tumor site: Low rectal/anal canal  Tumor location: Below the anterior peritoneal reflection  Tumor size: 2.5 x 1.5 cm  Macroscopic tumor perforation: Not identified  Macroscopic evaluation of mesorectum: Complete  Histologic type: Adenocarcinoma  Histologic grade: G2, G3: Mixed areas of moderately and poorly differentiated  Tumor extension: Tumor invades through the muscularis propria into pericolorectal tissue  Margins  Margins are uninvolved by invasive carcinoma, low and high-grade dysplasia/intramucosal carcinoma  Margins examined: Proximal, distal, radial  Distance of invasive carcinoma from closest margin: 1.5 mm from radial margin (slide 1 C)  Treatment effect absent, extensive residual cancer with no evident tumor regression (poor or no response,  score 3)  Lymphovascular invasion: Not identified  Perineural invasion: Present  Pathologic stage classification (pTNM, AJCC 8th ed)  Primary tumor (pT)  pT3: Tumor invades through the muscularis propria into pericolorectal tissues  Regional lymph nodes (pN)  pN0: No regional lymph node metastasis  Number of lymph nodes examined: 9  (specimen searched twice for lymph nodes)  Distant metastasis: None submitted  CHARLETTE PAN M.D. 9/17/2020 10:28    Interval  hx  Feels well.  No pain.  Tolerating diet.  Good energy levels.  Wants to do more.    No issues with wound    Past Medical History:   Diagnosis Date    Diabetes mellitus         Past Surgical History:   Procedure Laterality Date    CATARACT EXTRACTION Bilateral     FLEXIBLE SIGMOIDOSCOPY N/A 8/11/2020    Procedure: SIGMOIDOSCOPY, FLEXIBLE;  Surgeon: ENMANUEL Galvin MD;  Location: [facility] (4TH FLR);  Service: Endoscopy;  Laterality: N/A;  RAPID - Pt lives 2 hours away - ERW    FLEXIBLE SIGMOIDOSCOPY N/A 9/3/2020    Procedure: SIGMOIDOSCOPY, FLEXIBLE;  Surgeon: ENMANUEL Galvin MD;  Location: [facility] OR 2ND FLR;  Service: Colon and Rectal;  Laterality: N/A;    LOW ANTERIOR RESECTION OF COLON N/A 9/3/2020    Procedure: RESECTION, COLON, LOW ANTERIOR;  Surgeon: ENMANUEL Galvin MD;  Location: [facility] OR 2ND FLR;  Service: Colon and Rectal;  Laterality: N/A;    MOBILIZATION OF SPLENIC FLEXURE N/A 9/3/2020    Procedure: MOBILIZATION, SPLENIC FLEXURE;  Surgeon: ENMANUEL Galvin MD;  Location: [facility] OR 2ND FLR;  Service: Colon and Rectal;  Laterality: N/A;    TONSILLECTOMY         Review of patient's allergies indicates:  No Known Allergies    Family History   Problem Relation Age of Onset    Cancer Mother     Cancer Maternal Uncle        Social History     Socioeconomic History    Marital status:      Spouse name: Not on file    Number of children: Not on file    Years of education: Not on file    Highest education level: Not on file   Occupational History    Not on file   Social Needs    Financial resource strain: Not on file    Food insecurity     Worry: Not on file     Inability: Not on file    Transportation needs     Medical: Not on file     Non-medical: Not on file   Tobacco Use    Smoking status: Never Smoker    Smokeless tobacco: Never Used   Substance and Sexual Activity    Alcohol use: Never     Frequency: Never    Drug use: Never    Sexual activity: Yes   Lifestyle    Physical activity      Days per week: Not on file     Minutes per session: Not on file    Stress: Not on file   Relationships    Social connections     Talks on phone: Not on file     Gets together: Not on file     Attends Hinduism service: Not on file     Active member of club or organization: Not on file     Attends meetings of clubs or organizations: Not on file     Relationship status: Not on file   Other Topics Concern    Not on file   Social History Narrative    Not on file       ROS:  GENERAL: No fever, chills, fatigability or weight loss.  Integument: No rashes, redness, icterus  CHEST: Denies MCGARRY, cyanosis, wheezing, cough and sputum production.  CARDIOVASCULAR: Denies chest pain, PND, orthopnea or reduced exercise tolerance.  GI: Denies abd pain, dysphagia, nausea, vomiting, no hematemesis   : Denies burning on urination, no hematuria, no bacteriuria  MSK: No deformities, swelling, joint pain swelling  Neurologic: No HAs, seizures, weakness, paresthesias, gait problems    PE:  General appearance well  /84 (BP Location: Left arm, Patient Position: Sitting, BP Method: Large (Manual))   Ht 6' (1.829 m)   Wt 88.4 kg (194 lb 12.8 oz)   BMI 26.42 kg/m²     Sclera/ Skin anicteric  LN none palpable  AT NC EOMI  Neck supple trachea midline   Chest symmetric, nl excursion, no retractions, breathing comfortably  Abdomen stoma pink, wound healed  ND soft NT.  no masses, no organomegaly  EXT - no CCE  Neuro:  Mood/ affect nl, alert and oriented x 3, moves all ext's, gait nl    Rectal  Inspection nl  ADELAIDA nl tone, intact anastomosis, no defect      Assessment:  Doing well after ultra LAR ISR CJPAA DLI    Plan:  Check CT scan  Flex sigmoidoscopy  Close ileostomy.

## 2020-11-04 ENCOUNTER — PATIENT MESSAGE (OUTPATIENT)
Dept: SURGERY | Facility: HOSPITAL | Age: 65
End: 2020-11-04

## 2020-11-04 ENCOUNTER — TELEPHONE (OUTPATIENT)
Dept: SURGERY | Facility: CLINIC | Age: 65
End: 2020-11-04

## 2020-11-04 ENCOUNTER — OFFICE VISIT (OUTPATIENT)
Dept: SURGERY | Facility: CLINIC | Age: 65
End: 2020-11-04
Payer: MEDICARE

## 2020-11-04 ENCOUNTER — TELEPHONE (OUTPATIENT)
Dept: ENDOSCOPY | Facility: HOSPITAL | Age: 65
End: 2020-11-04

## 2020-11-04 ENCOUNTER — HOSPITAL ENCOUNTER (OUTPATIENT)
Dept: RADIOLOGY | Facility: HOSPITAL | Age: 65
Discharge: HOME | End: 2020-11-04
Attending: COLON & RECTAL SURGERY
Payer: MEDICARE

## 2020-11-04 VITALS
BODY MASS INDEX: 26.38 KG/M2 | HEIGHT: 72 IN | DIASTOLIC BLOOD PRESSURE: 84 MMHG | SYSTOLIC BLOOD PRESSURE: 132 MMHG | WEIGHT: 194.81 LBS

## 2020-11-04 DIAGNOSIS — Z85.048 HISTORY OF RECTAL CANCER: ICD-10-CM

## 2020-11-04 DIAGNOSIS — C20 RECTAL CANCER: Primary | ICD-10-CM

## 2020-11-04 LAB
CREAT SERPL-MCNC: 1.1 MG/DL (ref 0.5–1.4)
SAMPLE: NORMAL

## 2020-11-04 PROCEDURE — 74177 CT ABDOMEN PELVIS WITH CONTRAST: ICD-10-PCS | Mod: 26,,, | Performed by: RADIOLOGY

## 2020-11-04 PROCEDURE — 99024 PR POST-OP FOLLOW-UP VISIT: ICD-10-PCS | Mod: S$GLB,,, | Performed by: COLON & RECTAL SURGERY

## 2020-11-04 PROCEDURE — 99024 POSTOP FOLLOW-UP VISIT: CPT | Mod: S$GLB,,, | Performed by: COLON & RECTAL SURGERY

## 2020-11-04 PROCEDURE — 99999 PR PBB SHADOW E&M-EST. PATIENT-LVL IV: ICD-10-PCS | Mod: PBBFAC,,, | Performed by: COLON & RECTAL SURGERY

## 2020-11-04 PROCEDURE — 25500020 PHARM REV CODE 255: Performed by: COLON & RECTAL SURGERY

## 2020-11-04 PROCEDURE — 74177 CT ABD & PELVIS W/CONTRAST: CPT | Mod: 26,,, | Performed by: RADIOLOGY

## 2020-11-04 PROCEDURE — 74177 CT ABD & PELVIS W/CONTRAST: CPT | Mod: TC

## 2020-11-04 PROCEDURE — 99999 PR PBB SHADOW E&M-EST. PATIENT-LVL IV: CPT | Mod: PBBFAC,,, | Performed by: COLON & RECTAL SURGERY

## 2020-11-04 RX ORDER — NEOMYCIN SULFATE 500 MG/1
TABLET ORAL
Qty: 6 TABLET | Refills: 0 | Status: ON HOLD | OUTPATIENT
Start: 2020-11-04 | End: 2020-12-04

## 2020-11-04 RX ORDER — METRONIDAZOLE 500 MG/1
TABLET ORAL
Qty: 3 TABLET | Refills: 0 | Status: ON HOLD | OUTPATIENT
Start: 2020-11-04 | End: 2020-12-04

## 2020-11-04 RX ORDER — POLYETHYLENE GLYCOL 3350 17 G/17G
POWDER, FOR SOLUTION ORAL
Qty: 238 G | Refills: 0 | Status: SHIPPED | OUTPATIENT
Start: 2020-11-04

## 2020-11-04 RX ORDER — LOPERAMIDE HYDROCHLORIDE 2 MG/1
2 CAPSULE ORAL 2 TIMES DAILY
COMMUNITY

## 2020-11-04 RX ADMIN — IOHEXOL 100 ML: 350 INJECTION, SOLUTION INTRAVENOUS at 11:11

## 2020-11-04 RX ADMIN — IOHEXOL 30 ML: 350 INJECTION, SOLUTION INTRAVENOUS at 11:11

## 2020-11-04 NOTE — TELEPHONE ENCOUNTER
Left Oxana a message to let her know I sent a message to the schedulers that Dr Galvin wants the scope scheduled for next week.

## 2020-11-04 NOTE — TELEPHONE ENCOUNTER
----- Message from Ana Cristina Armstrong sent at 11/4/2020 10:24 AM CST -----  Nenita(wife)[phone]    She states that they can't get appt for flex sig until 11/17

## 2020-11-04 NOTE — TELEPHONE ENCOUNTER
----- Message from Gillian Jones RN sent at 11/4/2020 11:32 AM CST -----  There are No appointments available on Nov.10. He has Nov. 17. Please advise  ----- Message -----  From: Aparna Giron LPN  Sent: 11/4/2020  11:20 AM CST  To: [facility] Schedulers    Pt called me and said the first available  scope date was 11/18/20. Dr Galvin said he wants to do pt's scope next week.    Thanks   Aparna

## 2020-11-05 ENCOUNTER — TELEPHONE (OUTPATIENT)
Dept: ENDOSCOPY | Facility: HOSPITAL | Age: 65
End: 2020-11-05

## 2020-11-05 NOTE — TELEPHONE ENCOUNTER
Pt scheduled for flex sig. Does he need enemas via ostomy or just clear liquids the night before. Please advise.

## 2020-11-17 ENCOUNTER — ANESTHESIA EVENT (OUTPATIENT)
Dept: ENDOSCOPY | Facility: HOSPITAL | Age: 65
End: 2020-11-17
Payer: MEDICARE

## 2020-11-17 ENCOUNTER — HOSPITAL ENCOUNTER (OUTPATIENT)
Facility: HOSPITAL | Age: 65
Discharge: HOME | End: 2020-11-17
Attending: COLON & RECTAL SURGERY | Admitting: COLON & RECTAL SURGERY
Payer: MEDICARE

## 2020-11-17 ENCOUNTER — ANESTHESIA (OUTPATIENT)
Dept: ENDOSCOPY | Facility: HOSPITAL | Age: 65
End: 2020-11-17
Payer: MEDICARE

## 2020-11-17 VITALS
BODY MASS INDEX: 26.01 KG/M2 | HEART RATE: 86 BPM | RESPIRATION RATE: 15 BRPM | DIASTOLIC BLOOD PRESSURE: 70 MMHG | WEIGHT: 192 LBS | TEMPERATURE: 98 F | OXYGEN SATURATION: 100 % | SYSTOLIC BLOOD PRESSURE: 133 MMHG | HEIGHT: 72 IN

## 2020-11-17 DIAGNOSIS — C20 RECTAL CANCER: ICD-10-CM

## 2020-11-17 LAB
POCT GLUCOSE: 114 MG/DL (ref 70–110)
SARS-COV-2 RDRP RESP QL NAA+PROBE: NEGATIVE

## 2020-11-17 PROCEDURE — E9220 PRA ENDO ANESTHESIA: HCPCS | Mod: ,,, | Performed by: STUDENT IN AN ORGANIZED HEALTH CARE EDUCATION/TRAINING PROGRAM

## 2020-11-17 PROCEDURE — 45330 DIAGNOSTIC SIGMOIDOSCOPY: CPT | Performed by: COLON & RECTAL SURGERY

## 2020-11-17 PROCEDURE — E9220 PRA ENDO ANESTHESIA: ICD-10-PCS | Mod: ,,, | Performed by: STUDENT IN AN ORGANIZED HEALTH CARE EDUCATION/TRAINING PROGRAM

## 2020-11-17 PROCEDURE — 37000008 HC ANESTHESIA 1ST 15 MINUTES: Performed by: COLON & RECTAL SURGERY

## 2020-11-17 PROCEDURE — U0002 COVID-19 LAB TEST NON-CDC: HCPCS

## 2020-11-17 PROCEDURE — 82962 GLUCOSE BLOOD TEST: CPT | Performed by: COLON & RECTAL SURGERY

## 2020-11-17 PROCEDURE — 37000009 HC ANESTHESIA EA ADD 15 MINS: Performed by: COLON & RECTAL SURGERY

## 2020-11-17 PROCEDURE — 25000003 PHARM REV CODE 250: Performed by: COLON & RECTAL SURGERY

## 2020-11-17 PROCEDURE — 63600175 PHARM REV CODE 636 W HCPCS: Performed by: STUDENT IN AN ORGANIZED HEALTH CARE EDUCATION/TRAINING PROGRAM

## 2020-11-17 PROCEDURE — 45330 DIAGNOSTIC SIGMOIDOSCOPY: CPT | Mod: ,,, | Performed by: COLON & RECTAL SURGERY

## 2020-11-17 PROCEDURE — 45330 PR SIGMOIDOSCOPY,DIAG2STIC: ICD-10-PCS | Mod: ,,, | Performed by: COLON & RECTAL SURGERY

## 2020-11-17 RX ORDER — PROPOFOL 10 MG/ML
VIAL (ML) INTRAVENOUS CONTINUOUS PRN
Status: DISCONTINUED | OUTPATIENT
Start: 2020-11-17 | End: 2020-11-17

## 2020-11-17 RX ORDER — SODIUM CHLORIDE 9 MG/ML
INJECTION, SOLUTION INTRAVENOUS CONTINUOUS
Status: DISCONTINUED | OUTPATIENT
Start: 2020-11-17 | End: 2020-11-17 | Stop reason: HOSPADM

## 2020-11-17 RX ORDER — PROPOFOL 10 MG/ML
VIAL (ML) INTRAVENOUS
Status: DISCONTINUED | OUTPATIENT
Start: 2020-11-17 | End: 2020-11-17

## 2020-11-17 RX ORDER — LIDOCAINE HCL/PF 100 MG/5ML
SYRINGE (ML) INTRAVENOUS
Status: DISCONTINUED | OUTPATIENT
Start: 2020-11-17 | End: 2020-11-17

## 2020-11-17 RX ADMIN — SODIUM CHLORIDE: 0.9 INJECTION, SOLUTION INTRAVENOUS at 09:11

## 2020-11-17 RX ADMIN — Medication 80 MG: at 10:11

## 2020-11-17 RX ADMIN — PROPOFOL 10 MG: 10 INJECTION, EMULSION INTRAVENOUS at 10:11

## 2020-11-17 RX ADMIN — PROPOFOL 150 MCG/KG/MIN: 10 INJECTION, EMULSION INTRAVENOUS at 10:11

## 2020-11-17 RX ADMIN — PROPOFOL 70 MG: 10 INJECTION, EMULSION INTRAVENOUS at 10:11

## 2020-11-17 NOTE — DISCHARGE INSTRUCTIONS
Sigmoidoscopy    Sigmoidoscopy is a procedure used to view the lower colon and rectum. This test can help find the source of belly pain, rectal bleeding, and changes in bowel habits. Sigmoidoscopy is also used as part of the screening for colorectal cancer. It is done using a sigmoidoscope, a flexible tube with a viewing lens and light.  If youre 50 or over, the American [employer] Society recommends having this test in addition to stool tests, every 5 years to screen for colorectal cancer. Your healthcare provider may also recommend other colon cancer screening methods such as colonoscopy.   Getting ready  Here is how to prepare:  · Be sure to tell your healthcare provider about any medicines you take. Also tell your healthcare provider about any health conditions you may have.  · Ask your healthcare provider about the risks of the test. These include bleeding and bowel puncture.  · Your rectum and colon must be empty for the test, so be sure to follow the diet and bowel prep instructions. Otherwise the test may need to be rescheduled.  During the test  Here is what to expect:  · The test is done in the healthcare providers office or in a hospital endoscopy unit. You may wear a gown or a drape over your lower body.  · The procedure usually takes 10 to 20 minutes.  · The healthcare provider does a digital rectal exam to check for anal and rectal problems. The rectum is lubricated and the scope inserted.  · You may have a feeling similar to needing to have a bowel movement. You may also feel pressure when air is pumped into the colon This is done so that the healthcare provider can get a better view. Its expected that you will pass gas during the procedure.  After the test  Here is what to expect:  · Usually youll discuss the results with your healthcare provider right away, unless youre having other tests.  · If biopsies (tissue samples) were taken, you'll want to ask when to contact the for results.   · Try to  pass all the gas right after the test. Otherwise you may have bloating and cramping.  · After the test you can go back to your normal eating and other activities.  When to call your healthcare provider  Call if you have any of the following after the procedure:  · Pain in your belly  · Fever  · Dizziness or weakness  · Excessive rectal bleeding. Slight bleeding or spotting is normal, especially if a biopsy was taken.   Date Last Reviewed: 7/1/2016 © 2000-2017 [employer]. [address] [place] [address]. All rights reserved. This information is not intended as a substitute for professional medical care. Always follow your healthcare professional's instructions.

## 2020-11-17 NOTE — [employer] PATIENT INSTRUCTIONS
Discharge Summary/Instructions after an Endoscopic Procedure  Patient Name: Franky Peraza  Patient MRN: [id number]  Patient YOB: 1955  Tuesday, November 17, 2020  Odin Galvin MD  RESTRICTIONS:  During your procedure today, you received medications for sedation.  These   medications may affect your judgment, balance and coordination.  Therefore,   for 24 hours, you have the following restrictions:   - DO NOT drive a car, operate machinery, make legal/financial decisions,   sign important papers or drink alcohol.    ACTIVITY:  Today: no heavy lifting, straining or running due to procedural   sedation/anesthesia.  The following day: return to full activity including work.  DIET:  Eat and drink normally unless instructed otherwise.     TREATMENT FOR COMMON SIDE EFFECTS:  - Mild abdominal pain, nausea, belching, bloating or excessive gas:  rest,   eat lightly and use a heating pad.  - Sore Throat: treat with throat lozenges and/or gargle with warm salt   water.  - Because air was used during the procedure, expelling large amounts of air   from your rectum or belching is normal.  - If a bowel prep was taken, you may not have a bowel movement for 1-3 days.    This is normal.  SYMPTOMS TO WATCH FOR AND REPORT TO YOUR PHYSICIAN:  1. Abdominal pain or bloating, other than gas cramps.  2. Chest pain.  3. Back pain.  4. Signs of infection such as: chills or fever occurring within 24 hours   after the procedure.  5. Rectal bleeding, which would show as bright red, maroon, or black stools.   (A tablespoon of blood from the rectum is not serious, especially if   hemorrhoids are present.)  6. Vomiting.  7. Weakness or dizziness.  GO DIRECTLY TO THE NEAREST EMERGENCY ROOM IF YOU HAVE ANY OF THE FOLLOWING:      Difficulty breathing              Chills and/or fever over 101 F   Persistent vomiting and/or vomiting blood   Severe abdominal pain   Severe chest pain   Black, tarry stools   Bleeding- more than one  tablespoon   Any other symptom or condition that you feel may need urgent attention  Your doctor recommends these additional instructions:  If any biopsies were taken, your doctors clinic will contact you in 1 to 2   weeks with any results.  - Discharge patient to home (ambulatory).   - Patient has a contact number available for emergencies.  The signs and   symptoms of potential delayed complications were discussed with the   patient.  Return to normal activities tomorrow.  Written discharge   instructions were provided to the patient.   - Resume previous diet.   - Continue present medications.   - Return to my office as previously scheduled.  For questions, problems or results please call your physician - Odin Galvin MD at Work:  [phone].  [facility], EMERGENCY ROOM PHONE NUMBER: [phone]  IF A COMPLICATION OR EMERGENCY SITUATION ARISES AND YOU ARE UNABLE TO REACH   YOUR PHYSICIAN - GO DIRECTLY TO THE EMERGENCY ROOM.  Odin Galvin MD  11/17/2020 10:56:08 AM  This report has been verified and signed electronically.  [employer]

## 2020-11-17 NOTE — TRANSFER OF CARE
Anesthesia Transfer of Care Note    Patient: Franky Peraza    Procedure(s) Performed: Procedure(s) (LRB):  SIGMOIDOSCOPY, FLEXIBLE (N/A)    Patient location: PACU    Anesthesia Type: general    Transport from OR: Transported from OR on room air with adequate spontaneous ventilation    Post pain: adequate analgesia    Post assessment: no apparent anesthetic complications    Post vital signs: stable    Level of consciousness: awake    Nausea/Vomiting: no nausea/vomiting    Complications: none    Transfer of care protocol was followed      Last vitals:   Visit Vitals  /77   Pulse 87   Temp 36.7 °C (98 °F)   Resp 15   Ht 6' (1.829 m)   Wt 87.1 kg (192 lb)   SpO2 95%   BMI 26.04 kg/m²

## 2020-11-17 NOTE — H&P (VIEW-ONLY)
COLONOSCOPY HISTORY AND PE    Procedure : Colonoscopy      INDICATIONS: postoperative evaluation of anastomosis following ultra LAR with hand-sewn coloanal anastomosis      Past Medical History:   Diagnosis Date    Diabetes mellitus        Past Surgical History:   Procedure Laterality Date    CATARACT EXTRACTION Bilateral     FLEXIBLE SIGMOIDOSCOPY N/A 8/11/2020    Procedure: SIGMOIDOSCOPY, FLEXIBLE;  Surgeon: ENMANUEL Galvin MD;  Location: [facility] (4TH FLR);  Service: Endoscopy;  Laterality: N/A;  RAPID - Pt lives 2 hours away - ERW    FLEXIBLE SIGMOIDOSCOPY N/A 9/3/2020    Procedure: SIGMOIDOSCOPY, FLEXIBLE;  Surgeon: ENMANUEL Galvin MD;  Location: [facility] OR 2ND FLR;  Service: Colon and Rectal;  Laterality: N/A;    LOW ANTERIOR RESECTION OF COLON N/A 9/3/2020    Procedure: RESECTION, COLON, LOW ANTERIOR;  Surgeon: ENMANUEL Galvin MD;  Location: [facility] OR 2ND FLR;  Service: Colon and Rectal;  Laterality: N/A;    MOBILIZATION OF SPLENIC FLEXURE N/A 9/3/2020    Procedure: MOBILIZATION, SPLENIC FLEXURE;  Surgeon: ENMANUEL Galvin MD;  Location: [facility] OR 2ND FLR;  Service: Colon and Rectal;  Laterality: N/A;    TONSILLECTOMY         Review of patient's allergies indicates:  No Known Allergies    No current facility-administered medications on file prior to encounter.      Current Outpatient Medications on File Prior to Encounter   Medication Sig Dispense Refill    allopurinol (ZYLOPRIM) 100 MG tablet Take 100 mg by mouth once daily.      metFORMIN (GLUCOPHAGE) 500 MG tablet Take 500 mg by mouth 2 (two) times daily with meals.      acetaminophen (TYLENOL) 500 MG tablet Take 1 tablet (500 mg total) by mouth every 6 (six) hours as needed for Pain. (Patient not taking: Reported on 11/4/2020) 30 tablet 0    ibuprofen (ADVIL,MOTRIN) 800 MG tablet Take 1 tablet (800 mg total) by mouth every 8 (eight) hours as needed for Other (for pain). (Patient not taking: Reported on 11/4/2020) 30 tablet 0    loperamide  (IMODIUM) 2 mg capsule Take 2 mg by mouth 2 (two) times a day.      oxyCODONE (ROXICODONE) 5 MG immediate release tablet Take 1 tablet (5 mg total) by mouth every 4 (four) hours as needed for Pain. (Patient not taking: Reported on 11/4/2020) 30 tablet 0       Family History   Problem Relation Age of Onset    Cancer Mother     Cancer Maternal Uncle        Social History     Socioeconomic History    Marital status:      Spouse name: Not on file    Number of children: Not on file    Years of education: Not on file    Highest education level: Not on file   Occupational History    Not on file   Social Needs    Financial resource strain: Not on file    Food insecurity     Worry: Not on file     Inability: Not on file    Transportation needs     Medical: Not on file     Non-medical: Not on file   Tobacco Use    Smoking status: Former Smoker    Smokeless tobacco: Never Used   Substance and Sexual Activity    Alcohol use: Never     Frequency: Never    Drug use: Never    Sexual activity: Yes   Lifestyle    Physical activity     Days per week: Not on file     Minutes per session: Not on file    Stress: Not on file   Relationships    Social connections     Talks on phone: Not on file     Gets together: Not on file     Attends Muslim service: Not on file     Active member of club or organization: Not on file     Attends meetings of clubs or organizations: Not on file     Relationship status: Not on file   Other Topics Concern    Not on file   Social History Narrative    Not on file       Review of Systems -    Respiratory : no cough, shortness of breath, or wheezing  Cardiovascular  no chest pain or dyspnea on exertion  Gastrointestinal no abdominal pain, change in bowel habits, or black or bloody stools  Musculoskeletal no deformities, swelling  Neurological no TIA or stroke symptoms        Physical Exam:  General: NAD  AT NC EOMI  Mallampati Score   Neck supple, trachea midline  Lungs: nl  excursions, no retractions.  Breathing comfortably  Abdomen ND soft NT.  No masses  Extremities: No CCE.      ASA:  II    PLAN  COLONOSCOPY.  The details of the procedure, the possible need for biopsy or polypectomy and the potential risks including bleeding, perforation, missed polyps were discussed in detail.

## 2020-11-17 NOTE — ANESTHESIA PREPROCEDURE EVALUATION
11/17/2020  Franky Peraza is a 65 y.o., male.  Pre-operative evaluation for Procedure(s) (LRB):  SIGMOIDOSCOPY, FLEXIBLE (N/A)    Franky Peraza is a 65 y.o. male     Patient Active Problem List   Diagnosis    Rectal cancer       Review of patient's allergies indicates:  No Known Allergies    No current facility-administered medications on file prior to encounter.      Current Outpatient Medications on File Prior to Encounter   Medication Sig Dispense Refill    allopurinol (ZYLOPRIM) 100 MG tablet Take 100 mg by mouth once daily.      metFORMIN (GLUCOPHAGE) 500 MG tablet Take 500 mg by mouth 2 (two) times daily with meals.      acetaminophen (TYLENOL) 500 MG tablet Take 1 tablet (500 mg total) by mouth every 6 (six) hours as needed for Pain. (Patient not taking: Reported on 11/4/2020) 30 tablet 0    ibuprofen (ADVIL,MOTRIN) 800 MG tablet Take 1 tablet (800 mg total) by mouth every 8 (eight) hours as needed for Other (for pain). (Patient not taking: Reported on 11/4/2020) 30 tablet 0    loperamide (IMODIUM) 2 mg capsule Take 2 mg by mouth 2 (two) times a day.      oxyCODONE (ROXICODONE) 5 MG immediate release tablet Take 1 tablet (5 mg total) by mouth every 4 (four) hours as needed for Pain. (Patient not taking: Reported on 11/4/2020) 30 tablet 0       Past Surgical History:   Procedure Laterality Date    CATARACT EXTRACTION Bilateral     FLEXIBLE SIGMOIDOSCOPY N/A 8/11/2020    Procedure: SIGMOIDOSCOPY, FLEXIBLE;  Surgeon: ENMANUEL Galvin MD;  Location: [facility] (4TH FLR);  Service: Endoscopy;  Laterality: N/A;  RAPID - Pt lives 2 hours away - ERW    FLEXIBLE SIGMOIDOSCOPY N/A 9/3/2020    Procedure: SIGMOIDOSCOPY, FLEXIBLE;  Surgeon: ENMANUEL Galvin MD;  Location: [facility] OR 2ND FLR;  Service: Colon and Rectal;  Laterality: N/A;    LOW ANTERIOR RESECTION OF COLON N/A 9/3/2020    Procedure: RESECTION,  COLON, LOW ANTERIOR;  Surgeon: ENMANUEL Galvin MD;  Location: [facility] OR 2ND FLR;  Service: Colon and Rectal;  Laterality: N/A;    MOBILIZATION OF SPLENIC FLEXURE N/A 9/3/2020    Procedure: MOBILIZATION, SPLENIC FLEXURE;  Surgeon: ENMANUEL Galvin MD;  Location: [facility] OR 2ND FLR;  Service: Colon and Rectal;  Laterality: N/A;    TONSILLECTOMY         Social History     Socioeconomic History    Marital status:      Spouse name: Not on file    Number of children: Not on file    Years of education: Not on file    Highest education level: Not on file   Occupational History    Not on file   Social Needs    Financial resource strain: Not on file    Food insecurity     Worry: Not on file     Inability: Not on file    Transportation needs     Medical: Not on file     Non-medical: Not on file   Tobacco Use    Smoking status: Former Smoker    Smokeless tobacco: Never Used   Substance and Sexual Activity    Alcohol use: Never     Frequency: Never    Drug use: Never    Sexual activity: Yes   Lifestyle    Physical activity     Days per week: Not on file     Minutes per session: Not on file    Stress: Not on file   Relationships    Social connections     Talks on phone: Not on file     Gets together: Not on file     Attends Yazidism service: Not on file     Active member of club or organization: Not on file     Attends meetings of clubs or organizations: Not on file     Relationship status: Not on file   Other Topics Concern    Not on file   Social History Narrative    Not on file         Anesthesia Evaluation    I have reviewed the Patient Summary Reports.    I have reviewed the Nursing Notes.    I have reviewed the Medications.     Review of Systems  Anesthesia Hx:  No problems with previous Anesthesia  History of prior surgery of interest to airway management or planning: Denies Family Hx of Anesthesia complications.   Denies Personal Hx of Anesthesia complications.   Social:  Non-Smoker     Hematology/Oncology:  Hematology Normal   Oncology Normal     EENT/Dental:EENT/Dental Normal   Cardiovascular:  Cardiovascular Normal     Pulmonary:  Pulmonary Normal    Renal/:  Renal/ Normal     Hepatic/GI:  Hepatic/GI Normal    Musculoskeletal:  Musculoskeletal Normal    Neurological:  Neurology Normal    Endocrine:   Diabetes    Psych:  Psychiatric Normal           Physical Exam  General:  Well nourished    Airway/Jaw/Neck:  Airway Findings: Mouth Opening: Normal Tongue: Normal  General Airway Assessment: Adult  Mallampati: II  TM Distance: Normal, at least 6 cm  Jaw/Neck Findings:  Neck ROM: Normal ROM      Dental:  Dental Findings: In tact   Chest/Lungs:  Chest/Lungs Findings: Clear to auscultation, Normal Respiratory Rate     Heart/Vascular:  Heart Findings: Rate: Normal  Rhythm: Regular Rhythm  Sounds: Normal        Mental Status:  Mental Status Findings:  Cooperative, Alert and Oriented         Anesthesia Plan  Type of Anesthesia, risks & benefits discussed:  Anesthesia Type:  general  Patient's Preference:   Intra-op Monitoring Plan: standard ASA monitors  Intra-op Monitoring Plan Comments:   Post Op Pain Control Plan: multimodal analgesia  Post Op Pain Control Plan Comments:   Induction:   IV  Beta Blocker:  Patient is not currently on a Beta-Blocker (No further documentation required).       Informed Consent: Patient understands risks and agrees with Anesthesia plan.  Questions answered. Anesthesia consent signed with patient.  ASA Score: 2     Day of Surgery Review of History & Physical:    H&P update referred to the surgeon.         Ready For Surgery From Anesthesia Perspective.

## 2020-11-17 NOTE — ANESTHESIA POSTPROCEDURE EVALUATION
Anesthesia Post Evaluation    Patient: Franky Peraza    Procedure(s) Performed: Procedure(s) (LRB):  SIGMOIDOSCOPY, FLEXIBLE (N/A)    Final Anesthesia Type: general    Patient location during evaluation: PACU  Patient participation: Yes- Able to Participate  Level of consciousness: awake and alert  Post-procedure vital signs: reviewed and stable  Pain management: adequate  Airway patency: patent    PONV status at discharge: No PONV  Anesthetic complications: no      Cardiovascular status: blood pressure returned to baseline  Respiratory status: unassisted, spontaneous ventilation and room air  Hydration status: euvolemic  Follow-up not needed.          Vitals Value Taken Time   /70 11/17/20 1121   Temp 36.7 °C (98 °F) 11/17/20 1100   Pulse 86 11/17/20 1121   Resp 15 11/17/20 1121   SpO2 100 % 11/17/20 1121         Event Time   Out of Recovery 11:31:22         Pain/Donnell Score: Donnell Score: 10 (11/17/2020 11:00 AM)

## 2020-11-17 NOTE — H&P
COLONOSCOPY HISTORY AND PE    Procedure : Colonoscopy      INDICATIONS: postoperative evaluation of anastomosis following ultra LAR with hand-sewn coloanal anastomosis      Past Medical History:   Diagnosis Date    Diabetes mellitus        Past Surgical History:   Procedure Laterality Date    CATARACT EXTRACTION Bilateral     FLEXIBLE SIGMOIDOSCOPY N/A 8/11/2020    Procedure: SIGMOIDOSCOPY, FLEXIBLE;  Surgeon: ENMANUEL Galvin MD;  Location: [facility] (4TH FLR);  Service: Endoscopy;  Laterality: N/A;  RAPID - Pt lives 2 hours away - ERW    FLEXIBLE SIGMOIDOSCOPY N/A 9/3/2020    Procedure: SIGMOIDOSCOPY, FLEXIBLE;  Surgeon: ENMANUEL Galvin MD;  Location: [facility] OR 2ND FLR;  Service: Colon and Rectal;  Laterality: N/A;    LOW ANTERIOR RESECTION OF COLON N/A 9/3/2020    Procedure: RESECTION, COLON, LOW ANTERIOR;  Surgeon: ENMANUEL Galvin MD;  Location: [facility] OR 2ND FLR;  Service: Colon and Rectal;  Laterality: N/A;    MOBILIZATION OF SPLENIC FLEXURE N/A 9/3/2020    Procedure: MOBILIZATION, SPLENIC FLEXURE;  Surgeon: ENMANUEL Galvin MD;  Location: [facility] OR 2ND FLR;  Service: Colon and Rectal;  Laterality: N/A;    TONSILLECTOMY         Review of patient's allergies indicates:  No Known Allergies    No current facility-administered medications on file prior to encounter.      Current Outpatient Medications on File Prior to Encounter   Medication Sig Dispense Refill    allopurinol (ZYLOPRIM) 100 MG tablet Take 100 mg by mouth once daily.      metFORMIN (GLUCOPHAGE) 500 MG tablet Take 500 mg by mouth 2 (two) times daily with meals.      acetaminophen (TYLENOL) 500 MG tablet Take 1 tablet (500 mg total) by mouth every 6 (six) hours as needed for Pain. (Patient not taking: Reported on 11/4/2020) 30 tablet 0    ibuprofen (ADVIL,MOTRIN) 800 MG tablet Take 1 tablet (800 mg total) by mouth every 8 (eight) hours as needed for Other (for pain). (Patient not taking: Reported on 11/4/2020) 30 tablet 0    loperamide  (IMODIUM) 2 mg capsule Take 2 mg by mouth 2 (two) times a day.      oxyCODONE (ROXICODONE) 5 MG immediate release tablet Take 1 tablet (5 mg total) by mouth every 4 (four) hours as needed for Pain. (Patient not taking: Reported on 11/4/2020) 30 tablet 0       Family History   Problem Relation Age of Onset    Cancer Mother     Cancer Maternal Uncle        Social History     Socioeconomic History    Marital status:      Spouse name: Not on file    Number of children: Not on file    Years of education: Not on file    Highest education level: Not on file   Occupational History    Not on file   Social Needs    Financial resource strain: Not on file    Food insecurity     Worry: Not on file     Inability: Not on file    Transportation needs     Medical: Not on file     Non-medical: Not on file   Tobacco Use    Smoking status: Former Smoker    Smokeless tobacco: Never Used   Substance and Sexual Activity    Alcohol use: Never     Frequency: Never    Drug use: Never    Sexual activity: Yes   Lifestyle    Physical activity     Days per week: Not on file     Minutes per session: Not on file    Stress: Not on file   Relationships    Social connections     Talks on phone: Not on file     Gets together: Not on file     Attends Congregational service: Not on file     Active member of club or organization: Not on file     Attends meetings of clubs or organizations: Not on file     Relationship status: Not on file   Other Topics Concern    Not on file   Social History Narrative    Not on file       Review of Systems -    Respiratory : no cough, shortness of breath, or wheezing  Cardiovascular  no chest pain or dyspnea on exertion  Gastrointestinal no abdominal pain, change in bowel habits, or black or bloody stools  Musculoskeletal no deformities, swelling  Neurological no TIA or stroke symptoms        Physical Exam:  General: NAD  AT NC EOMI  Mallampati Score   Neck supple, trachea midline  Lungs: nl  excursions, no retractions.  Breathing comfortably  Abdomen ND soft NT.  No masses  Extremities: No CCE.      ASA:  II    PLAN  COLONOSCOPY.  The details of the procedure, the possible need for biopsy or polypectomy and the potential risks including bleeding, perforation, missed polyps were discussed in detail.

## 2020-12-02 ENCOUNTER — TELEPHONE (OUTPATIENT)
Dept: SURGERY | Facility: CLINIC | Age: 65
End: 2020-12-02

## 2020-12-02 NOTE — PRE-PROCEDURE INSTRUCTIONS
PREOP INSTRUCTIONS:    Pre-op NPO instructions given per [facility] DEPT - Reviewed w/pt - pt repeated back correctly and verbalized a complete understanding.    Patient instructed to follow the surgeon's instructions if they differ from these.Shower instructions as well as directions to the Surgery Center were given.Patient encouraged to wear loose fitting,comfortable clothing.Medication instructions for pm prior to and am of procedure reviewed.Instructed patient to avoid taking vitamins,supplements,aspirin and ibuprofen the morning of surgery. Patient stated an understanding.Patient informed of the current visitor policy and advised patient that one visitor may accompany each patient into the hospital and wait (socially distanced) until a member of the medical team provides an update at the conclusion of the procedure.When they enter the hospital both patient and visitor will have their temperature checked.All visitors are asked to arrive with a mask and to keep their mask on throughout the visit.Each inpatient is allowed 1 visitor per day between the hours of 8am and 6pm.This visitor must remain in the patient's room and not gather in common areas such as waiting rooms or cafeterias.The visitor must be 18 years or older and arrive with a mask and keep the mask on throughout the visit.     Patient denies any side effects or issues with anesthesia or sedation.     Patient does not know arrival time.Explained that this information comes from the surgeon's office and if they haven't heard from them by 2 or 3 pm to call the office.Patient stated an understanding.

## 2020-12-03 ENCOUNTER — HOSPITAL ENCOUNTER (INPATIENT)
Facility: HOSPITAL | Age: 65
LOS: 1 days | Discharge: HOME | DRG: 331 | End: 2020-12-04
Attending: COLON & RECTAL SURGERY | Admitting: COLON & RECTAL SURGERY
Payer: MEDICARE

## 2020-12-03 ENCOUNTER — ANESTHESIA (OUTPATIENT)
Dept: SURGERY | Facility: HOSPITAL | Age: 65
DRG: 331 | End: 2020-12-03
Payer: MEDICARE

## 2020-12-03 ENCOUNTER — ANESTHESIA EVENT (OUTPATIENT)
Dept: SURGERY | Facility: HOSPITAL | Age: 65
DRG: 331 | End: 2020-12-03
Payer: MEDICARE

## 2020-12-03 DIAGNOSIS — C20 RECTAL CANCER: ICD-10-CM

## 2020-12-03 DIAGNOSIS — Z93.2 ILEOSTOMY IN PLACE: Primary | ICD-10-CM

## 2020-12-03 LAB
ABO + RH BLD: NORMAL
BLD GP AB SCN CELLS X3 SERPL QL: NORMAL
POCT GLUCOSE: 107 MG/DL (ref 70–110)
POCT GLUCOSE: 130 MG/DL (ref 70–110)
POCT GLUCOSE: 146 MG/DL (ref 70–110)
POCT GLUCOSE: 169 MG/DL (ref 70–110)
POCT GLUCOSE: 92 MG/DL (ref 70–110)
SARS-COV-2 RDRP RESP QL NAA+PROBE: NEGATIVE

## 2020-12-03 PROCEDURE — 71000016 HC POSTOP RECOV ADDL HR: Performed by: COLON & RECTAL SURGERY

## 2020-12-03 PROCEDURE — 63600175 PHARM REV CODE 636 W HCPCS: Performed by: NURSE PRACTITIONER

## 2020-12-03 PROCEDURE — 25000003 PHARM REV CODE 250: Performed by: STUDENT IN AN ORGANIZED HEALTH CARE EDUCATION/TRAINING PROGRAM

## 2020-12-03 PROCEDURE — 63600175 PHARM REV CODE 636 W HCPCS: Performed by: NURSE ANESTHETIST, CERTIFIED REGISTERED

## 2020-12-03 PROCEDURE — 25000003 PHARM REV CODE 250: Performed by: NURSE PRACTITIONER

## 2020-12-03 PROCEDURE — 94799 UNLISTED PULMONARY SVC/PX: CPT

## 2020-12-03 PROCEDURE — 99900035 HC TECH TIME PER 15 MIN (STAT)

## 2020-12-03 PROCEDURE — U0002 COVID-19 LAB TEST NON-CDC: HCPCS

## 2020-12-03 PROCEDURE — 63600175 PHARM REV CODE 636 W HCPCS: Performed by: STUDENT IN AN ORGANIZED HEALTH CARE EDUCATION/TRAINING PROGRAM

## 2020-12-03 PROCEDURE — 37000008 HC ANESTHESIA 1ST 15 MINUTES: Performed by: COLON & RECTAL SURGERY

## 2020-12-03 PROCEDURE — 25000003 PHARM REV CODE 250: Performed by: NURSE ANESTHETIST, CERTIFIED REGISTERED

## 2020-12-03 PROCEDURE — 88300 SURGICAL PATH GROSS: CPT | Mod: 26,,, | Performed by: PATHOLOGY

## 2020-12-03 PROCEDURE — 36000706: Performed by: COLON & RECTAL SURGERY

## 2020-12-03 PROCEDURE — D9220A PRA ANESTHESIA: ICD-10-PCS | Mod: CRNA,,, | Performed by: NURSE ANESTHETIST, CERTIFIED REGISTERED

## 2020-12-03 PROCEDURE — 88300 PR  SURG PATH,GROSS,LEVEL I: ICD-10-PCS | Mod: 26,,, | Performed by: PATHOLOGY

## 2020-12-03 PROCEDURE — S0030 INJECTION, METRONIDAZOLE: HCPCS | Performed by: NURSE PRACTITIONER

## 2020-12-03 PROCEDURE — 37000009 HC ANESTHESIA EA ADD 15 MINS: Performed by: COLON & RECTAL SURGERY

## 2020-12-03 PROCEDURE — D9220A PRA ANESTHESIA: Mod: CRNA,,, | Performed by: NURSE ANESTHETIST, CERTIFIED REGISTERED

## 2020-12-03 PROCEDURE — 44620 REPAIR BOWEL OPENING: CPT | Mod: ,,, | Performed by: COLON & RECTAL SURGERY

## 2020-12-03 PROCEDURE — 44620 PR CLOSE ENTEROSTOMY: ICD-10-PCS | Mod: ,,, | Performed by: COLON & RECTAL SURGERY

## 2020-12-03 PROCEDURE — D9220A PRA ANESTHESIA: Mod: ANES,,, | Performed by: ANESTHESIOLOGY

## 2020-12-03 PROCEDURE — 20600001 HC STEP DOWN PRIVATE ROOM

## 2020-12-03 PROCEDURE — 25000003 PHARM REV CODE 250: Performed by: COLON & RECTAL SURGERY

## 2020-12-03 PROCEDURE — 71000033 HC RECOVERY, INTIAL HOUR: Performed by: COLON & RECTAL SURGERY

## 2020-12-03 PROCEDURE — D9220A PRA ANESTHESIA: ICD-10-PCS | Mod: ANES,,, | Performed by: ANESTHESIOLOGY

## 2020-12-03 PROCEDURE — 71000015 HC POSTOP RECOV 1ST HR: Performed by: COLON & RECTAL SURGERY

## 2020-12-03 PROCEDURE — 88300 SURGICAL PATH GROSS: CPT | Performed by: PATHOLOGY

## 2020-12-03 PROCEDURE — 36000707: Performed by: COLON & RECTAL SURGERY

## 2020-12-03 PROCEDURE — 82962 GLUCOSE BLOOD TEST: CPT | Performed by: COLON & RECTAL SURGERY

## 2020-12-03 PROCEDURE — 86850 RBC ANTIBODY SCREEN: CPT

## 2020-12-03 PROCEDURE — 36590 PR REMOVAL TUNNELED CV CATH W SUBQ PORT OR PUMP: ICD-10-PCS | Mod: 51,,, | Performed by: COLON & RECTAL SURGERY

## 2020-12-03 PROCEDURE — 36590 REMOVAL TUNNELED CV CATH: CPT | Mod: 51,,, | Performed by: COLON & RECTAL SURGERY

## 2020-12-03 PROCEDURE — 94761 N-INVAS EAR/PLS OXIMETRY MLT: CPT

## 2020-12-03 PROCEDURE — 63600175 PHARM REV CODE 636 W HCPCS: Performed by: ANESTHESIOLOGY

## 2020-12-03 RX ORDER — SODIUM CHLORIDE 9 MG/ML
INJECTION, SOLUTION INTRAVENOUS
Status: COMPLETED | OUTPATIENT
Start: 2020-12-03 | End: 2020-12-03

## 2020-12-03 RX ORDER — GABAPENTIN 300 MG/1
300 CAPSULE ORAL 3 TIMES DAILY
Status: DISPENSED | OUTPATIENT
Start: 2020-12-03

## 2020-12-03 RX ORDER — SODIUM CHLORIDE 9 MG/ML
INJECTION, SOLUTION INTRAVENOUS CONTINUOUS
Status: DISCONTINUED | OUTPATIENT
Start: 2020-12-03 | End: 2020-12-04 | Stop reason: HOSPADM

## 2020-12-03 RX ORDER — ACETAMINOPHEN 500 MG
1000 TABLET ORAL EVERY 8 HOURS
Status: DISCONTINUED | OUTPATIENT
Start: 2020-12-04 | End: 2020-12-04 | Stop reason: HOSPADM

## 2020-12-03 RX ORDER — METRONIDAZOLE 500 MG/100ML
500 INJECTION, SOLUTION INTRAVENOUS
Status: COMPLETED | OUTPATIENT
Start: 2020-12-03 | End: 2020-12-03

## 2020-12-03 RX ORDER — GABAPENTIN 300 MG/1
300 CAPSULE ORAL
Status: COMPLETED | OUTPATIENT
Start: 2020-12-03 | End: 2020-12-03

## 2020-12-03 RX ORDER — MUPIROCIN 20 MG/G
1 OINTMENT TOPICAL
Status: COMPLETED | OUTPATIENT
Start: 2020-12-03 | End: 2020-12-03

## 2020-12-03 RX ORDER — PHENYLEPHRINE HYDROCHLORIDE 10 MG/ML
INJECTION INTRAVENOUS
Status: DISCONTINUED | OUTPATIENT
Start: 2020-12-03 | End: 2020-12-03

## 2020-12-03 RX ORDER — FENTANYL CITRATE 50 UG/ML
INJECTION, SOLUTION INTRAMUSCULAR; INTRAVENOUS
Status: DISCONTINUED | OUTPATIENT
Start: 2020-12-03 | End: 2020-12-03

## 2020-12-03 RX ORDER — HYDROMORPHONE HYDROCHLORIDE 1 MG/ML
0.2 INJECTION, SOLUTION INTRAMUSCULAR; INTRAVENOUS; SUBCUTANEOUS EVERY 5 MIN PRN
Status: DISCONTINUED | OUTPATIENT
Start: 2020-12-03 | End: 2020-12-03 | Stop reason: HOSPADM

## 2020-12-03 RX ORDER — OXYCODONE HYDROCHLORIDE 10 MG/1
10 TABLET ORAL EVERY 4 HOURS PRN
Status: DISCONTINUED | OUTPATIENT
Start: 2020-12-03 | End: 2020-12-04 | Stop reason: HOSPADM

## 2020-12-03 RX ORDER — LIDOCAINE HYDROCHLORIDE 10 MG/ML
1 INJECTION, SOLUTION EPIDURAL; INFILTRATION; INTRACAUDAL; PERINEURAL
Status: COMPLETED | OUTPATIENT
Start: 2020-12-03 | End: 2020-12-03

## 2020-12-03 RX ORDER — KETAMINE HCL IN 0.9 % NACL 50 MG/5 ML
SYRINGE (ML) INTRAVENOUS
Status: DISCONTINUED | OUTPATIENT
Start: 2020-12-03 | End: 2020-12-03

## 2020-12-03 RX ORDER — ACETAMINOPHEN 10 MG/ML
1000 INJECTION, SOLUTION INTRAVENOUS EVERY 8 HOURS
Status: COMPLETED | OUTPATIENT
Start: 2020-12-03 | End: 2020-12-04

## 2020-12-03 RX ORDER — GABAPENTIN 300 MG/1
300 CAPSULE ORAL 3 TIMES DAILY
Status: DISCONTINUED | OUTPATIENT
Start: 2020-12-03 | End: 2020-12-03

## 2020-12-03 RX ORDER — SODIUM CHLORIDE 9 MG/ML
INJECTION, SOLUTION INTRAVENOUS CONTINUOUS
Status: DISCONTINUED | OUTPATIENT
Start: 2020-12-03 | End: 2020-12-03

## 2020-12-03 RX ORDER — INSULIN ASPART 100 [IU]/ML
0-5 INJECTION, SOLUTION INTRAVENOUS; SUBCUTANEOUS
Status: DISCONTINUED | OUTPATIENT
Start: 2020-12-03 | End: 2020-12-04 | Stop reason: HOSPADM

## 2020-12-03 RX ORDER — TRIPROLIDINE/PSEUDOEPHEDRINE 2.5MG-60MG
600 TABLET ORAL
Status: COMPLETED | OUTPATIENT
Start: 2020-12-03 | End: 2020-12-03

## 2020-12-03 RX ORDER — ONDANSETRON 2 MG/ML
INJECTION INTRAMUSCULAR; INTRAVENOUS
Status: DISCONTINUED | OUTPATIENT
Start: 2020-12-03 | End: 2020-12-03

## 2020-12-03 RX ORDER — PROPOFOL 10 MG/ML
VIAL (ML) INTRAVENOUS
Status: DISCONTINUED | OUTPATIENT
Start: 2020-12-03 | End: 2020-12-03

## 2020-12-03 RX ORDER — MUPIROCIN 20 MG/G
OINTMENT TOPICAL 2 TIMES DAILY
Status: DISCONTINUED | OUTPATIENT
Start: 2020-12-03 | End: 2020-12-04 | Stop reason: HOSPADM

## 2020-12-03 RX ORDER — BUPIVACAINE HYDROCHLORIDE 2.5 MG/ML
INJECTION, SOLUTION EPIDURAL; INFILTRATION; INTRACAUDAL
Status: DISCONTINUED | OUTPATIENT
Start: 2020-12-03 | End: 2020-12-03 | Stop reason: HOSPADM

## 2020-12-03 RX ORDER — HEPARIN SODIUM 5000 [USP'U]/ML
5000 INJECTION, SOLUTION INTRAVENOUS; SUBCUTANEOUS EVERY 8 HOURS
Status: COMPLETED | OUTPATIENT
Start: 2020-12-03 | End: 2020-12-03

## 2020-12-03 RX ORDER — MIDAZOLAM HYDROCHLORIDE 1 MG/ML
INJECTION, SOLUTION INTRAMUSCULAR; INTRAVENOUS
Status: DISCONTINUED | OUTPATIENT
Start: 2020-12-03 | End: 2020-12-03

## 2020-12-03 RX ORDER — OXYCODONE HYDROCHLORIDE 5 MG/1
5 TABLET ORAL EVERY 6 HOURS PRN
Status: DISCONTINUED | OUTPATIENT
Start: 2020-12-03 | End: 2020-12-04 | Stop reason: HOSPADM

## 2020-12-03 RX ORDER — LIDOCAINE HYDROCHLORIDE 20 MG/ML
INJECTION, SOLUTION EPIDURAL; INFILTRATION; INTRACAUDAL; PERINEURAL
Status: DISCONTINUED | OUTPATIENT
Start: 2020-12-03 | End: 2020-12-03

## 2020-12-03 RX ORDER — SODIUM CHLORIDE 0.9 % (FLUSH) 0.9 %
10 SYRINGE (ML) INJECTION
Status: DISCONTINUED | OUTPATIENT
Start: 2020-12-03 | End: 2020-12-04 | Stop reason: HOSPADM

## 2020-12-03 RX ORDER — IBUPROFEN 400 MG/1
800 TABLET ORAL EVERY 8 HOURS
Status: DISCONTINUED | OUTPATIENT
Start: 2020-12-04 | End: 2020-12-04 | Stop reason: HOSPADM

## 2020-12-03 RX ORDER — TRAMADOL HYDROCHLORIDE 50 MG/1
50 TABLET ORAL EVERY 6 HOURS PRN
Status: DISCONTINUED | OUTPATIENT
Start: 2020-12-03 | End: 2020-12-04 | Stop reason: HOSPADM

## 2020-12-03 RX ORDER — ROCURONIUM BROMIDE 10 MG/ML
INJECTION, SOLUTION INTRAVENOUS
Status: DISCONTINUED | OUTPATIENT
Start: 2020-12-03 | End: 2020-12-03

## 2020-12-03 RX ORDER — ACETAMINOPHEN 650 MG/20.3ML
975 LIQUID ORAL
Status: COMPLETED | OUTPATIENT
Start: 2020-12-03 | End: 2020-12-03

## 2020-12-03 RX ORDER — GLUCAGON 1 MG
1 KIT INJECTION
Status: DISCONTINUED | OUTPATIENT
Start: 2020-12-03 | End: 2020-12-04 | Stop reason: HOSPADM

## 2020-12-03 RX ORDER — IBUPROFEN 200 MG
16 TABLET ORAL
Status: DISCONTINUED | OUTPATIENT
Start: 2020-12-03 | End: 2020-12-04 | Stop reason: HOSPADM

## 2020-12-03 RX ORDER — SODIUM CHLORIDE 0.9 % (FLUSH) 0.9 %
3 SYRINGE (ML) INJECTION
Status: DISCONTINUED | OUTPATIENT
Start: 2020-12-03 | End: 2020-12-04 | Stop reason: HOSPADM

## 2020-12-03 RX ORDER — ENOXAPARIN SODIUM 100 MG/ML
40 INJECTION SUBCUTANEOUS EVERY 24 HOURS
Status: DISCONTINUED | OUTPATIENT
Start: 2020-12-03 | End: 2020-12-04 | Stop reason: HOSPADM

## 2020-12-03 RX ORDER — IBUPROFEN 200 MG
24 TABLET ORAL
Status: DISCONTINUED | OUTPATIENT
Start: 2020-12-03 | End: 2020-12-04 | Stop reason: HOSPADM

## 2020-12-03 RX ADMIN — ROCURONIUM BROMIDE 10 MG: 10 INJECTION, SOLUTION INTRAVENOUS at 08:12

## 2020-12-03 RX ADMIN — ONDANSETRON 4 MG: 2 INJECTION INTRAMUSCULAR; INTRAVENOUS at 08:12

## 2020-12-03 RX ADMIN — ROCURONIUM BROMIDE 45 MG: 10 INJECTION, SOLUTION INTRAVENOUS at 07:12

## 2020-12-03 RX ADMIN — ROCURONIUM BROMIDE 10 MG: 10 INJECTION, SOLUTION INTRAVENOUS at 07:12

## 2020-12-03 RX ADMIN — GABAPENTIN 300 MG: 300 CAPSULE ORAL at 06:12

## 2020-12-03 RX ADMIN — IBUPROFEN 600 MG: 100 SUSPENSION ORAL at 06:12

## 2020-12-03 RX ADMIN — SUGAMMADEX 200 MG: 100 INJECTION, SOLUTION INTRAVENOUS at 08:12

## 2020-12-03 RX ADMIN — ENOXAPARIN SODIUM 40 MG: 40 INJECTION SUBCUTANEOUS at 04:12

## 2020-12-03 RX ADMIN — HYDROMORPHONE HYDROCHLORIDE 0.2 MG: 1 INJECTION, SOLUTION INTRAMUSCULAR; INTRAVENOUS; SUBCUTANEOUS at 09:12

## 2020-12-03 RX ADMIN — CEFTRIAXONE 2 G: 2 INJECTION, SOLUTION INTRAVENOUS at 07:12

## 2020-12-03 RX ADMIN — METRONIDAZOLE 500 MG: 500 SOLUTION INTRAVENOUS at 07:12

## 2020-12-03 RX ADMIN — HYDROMORPHONE HYDROCHLORIDE 0.2 MG: 1 INJECTION, SOLUTION INTRAMUSCULAR; INTRAVENOUS; SUBCUTANEOUS at 10:12

## 2020-12-03 RX ADMIN — PROPOFOL 140 MG: 10 INJECTION, EMULSION INTRAVENOUS at 07:12

## 2020-12-03 RX ADMIN — PHENYLEPHRINE HYDROCHLORIDE 100 MCG: 10 INJECTION INTRAVENOUS at 08:12

## 2020-12-03 RX ADMIN — SODIUM CHLORIDE, SODIUM GLUCONATE, SODIUM ACETATE, POTASSIUM CHLORIDE, MAGNESIUM CHLORIDE, SODIUM PHOSPHATE, DIBASIC, AND POTASSIUM PHOSPHATE: .53; .5; .37; .037; .03; .012; .00082 INJECTION, SOLUTION INTRAVENOUS at 06:12

## 2020-12-03 RX ADMIN — SODIUM CHLORIDE 40 ML/HR: 0.9 INJECTION, SOLUTION INTRAVENOUS at 09:12

## 2020-12-03 RX ADMIN — MUPIROCIN 1 G: 20 OINTMENT TOPICAL at 06:12

## 2020-12-03 RX ADMIN — ACETAMINOPHEN 1000 MG: 10 INJECTION, SOLUTION INTRAVENOUS at 02:12

## 2020-12-03 RX ADMIN — SODIUM CHLORIDE, SODIUM GLUCONATE, SODIUM ACETATE, POTASSIUM CHLORIDE, MAGNESIUM CHLORIDE, SODIUM PHOSPHATE, DIBASIC, AND POTASSIUM PHOSPHATE: .53; .5; .37; .037; .03; .012; .00082 INJECTION, SOLUTION INTRAVENOUS at 07:12

## 2020-12-03 RX ADMIN — PROPOFOL 30 MG: 10 INJECTION, EMULSION INTRAVENOUS at 07:12

## 2020-12-03 RX ADMIN — FENTANYL CITRATE 50 MCG: 50 INJECTION INTRAMUSCULAR; INTRAVENOUS at 07:12

## 2020-12-03 RX ADMIN — GABAPENTIN 300 MG: 300 CAPSULE ORAL at 10:12

## 2020-12-03 RX ADMIN — SODIUM CHLORIDE 10 ML/HR: 0.9 INJECTION, SOLUTION INTRAVENOUS at 06:12

## 2020-12-03 RX ADMIN — LIDOCAINE HYDROCHLORIDE 10 MG: 10 INJECTION, SOLUTION EPIDURAL; INFILTRATION; INTRACAUDAL at 05:12

## 2020-12-03 RX ADMIN — MIDAZOLAM 2 MG: 1 INJECTION INTRAMUSCULAR; INTRAVENOUS at 07:12

## 2020-12-03 RX ADMIN — IBUPROFEN 800 MG: 800 INJECTION INTRAVENOUS at 02:12

## 2020-12-03 RX ADMIN — GABAPENTIN 300 MG: 300 CAPSULE ORAL at 02:12

## 2020-12-03 RX ADMIN — ACETAMINOPHEN 976.6 MG: 160 SOLUTION ORAL at 06:12

## 2020-12-03 RX ADMIN — HEPARIN SODIUM 5000 UNITS: 5000 INJECTION INTRAVENOUS; SUBCUTANEOUS at 06:12

## 2020-12-03 RX ADMIN — IBUPROFEN 800 MG: 800 INJECTION INTRAVENOUS at 10:12

## 2020-12-03 RX ADMIN — LIDOCAINE HYDROCHLORIDE 80 MG: 20 INJECTION, SOLUTION EPIDURAL; INFILTRATION; INTRACAUDAL at 07:12

## 2020-12-03 RX ADMIN — Medication 15 MG: at 07:12

## 2020-12-03 RX ADMIN — PHENYLEPHRINE HYDROCHLORIDE 100 MCG: 10 INJECTION INTRAVENOUS at 07:12

## 2020-12-03 RX ADMIN — ACETAMINOPHEN 1000 MG: 10 INJECTION, SOLUTION INTRAVENOUS at 10:12

## 2020-12-03 RX ADMIN — OXYCODONE HYDROCHLORIDE 10 MG: 5 TABLET ORAL at 09:12

## 2020-12-03 NOTE — INTERVAL H&P NOTE
The patient has been examined and the H&P has been reviewed:    I concur with the findings and no changes have occurred since H&P was written.    Surgery risks, benefits and alternative options discussed and understood by patient/family.    To OR for ileostomy closure and port removal       Active Hospital Problems    Diagnosis  POA    Ileostomy in place [Z93.2]  Not Applicable      Resolved Hospital Problems   No resolved problems to display.

## 2020-12-03 NOTE — TRANSFER OF CARE
Anesthesia Transfer of Care Note    Patient: Franky Peraza    Procedure(s) Performed: Procedure(s) (LRB):  CLOSURE, ILEOSTOMY LOOP Remove infusion port, right chest (N/A)  subcutaneous port, right chest (Right)    Patient location: PACU    Anesthesia Type: general    Transport from OR: Transported from OR on 6-10 L/min O2 by face mask with adequate spontaneous ventilation    Post pain: adequate analgesia    Post assessment: no apparent anesthetic complications and tolerated procedure well    Post vital signs: stable    Level of consciousness: awake    Nausea/Vomiting: no nausea/vomiting    Complications: none    Transfer of care protocol was followed      Last vitals:   Visit Vitals  /65 (BP Location: Left arm, Patient Position: Lying)   Pulse 80   Temp 37.1 °C (98.8 °F) (Oral)   Resp 20   Ht 6' (1.829 m)   Wt 86.8 kg (191 lb 5.8 oz)   SpO2 100%   BMI 25.95 kg/m²

## 2020-12-03 NOTE — ANESTHESIA PROCEDURE NOTES
Intubation  Performed by: Ashley Castellanos CRNA  Authorized by: Elan Rodriges MD     Intubation:     Induction:  Intravenous    Intubated:  Postinduction    Mask Ventilation:  Easy with oral airway    Attempts:  1    Attempted By:  Student    Method of Intubation:  Direct    Blade:  Simmons 2    Laryngeal View Grade: Grade I - full view of chords      Difficult Airway Encountered?: No      Complications:  None    Airway Device:  Oral endotracheal tube    Airway Device Size:  7.5    Style/Cuff Inflation:  Cuffed (inflated to minimal occlusive pressure)    Tube secured:  22    Secured at:  The lips    Placement Verified By:  Capnometry    Complicating Factors:  None    Findings Post-Intubation:  BS equal bilateral and atraumatic/condition of teeth unchanged

## 2020-12-03 NOTE — ANESTHESIA POSTPROCEDURE EVALUATION
Anesthesia Post Evaluation    Patient: Franky Peraza    Procedure(s) Performed: Procedure(s) (LRB):  CLOSURE, ILEOSTOMY LOOP Remove infusion port, right chest (N/A)  subcutaneous port, right chest (Right)    Final Anesthesia Type: general    Patient location during evaluation: PACU  Patient participation: Yes- Able to Participate  Level of consciousness: awake and alert  Post-procedure vital signs: reviewed and stable  Pain management: adequate  Airway patency: patent    PONV status at discharge: No PONV  Anesthetic complications: no      Cardiovascular status: blood pressure returned to baseline and stable  Respiratory status: unassisted  Hydration status: euvolemic  Follow-up not needed.          Vitals Value Taken Time   /59 12/03/20 1148   Temp 36.4 °C (97.6 °F) 12/03/20 1148   Pulse 64 12/03/20 1148   Resp 16 12/03/20 1148   SpO2 94 % 12/03/20 1148         Event Time   Out of Recovery 09:18:00         Pain/Donnell Score: Pain Rating Prior to Med Admin: 4 (12/3/2020 10:19 AM)  Pain Rating Post Med Admin: 3 (12/3/2020  9:55 AM)  Donnell Score: 10 (12/3/2020  9:30 AM)

## 2020-12-03 NOTE — NURSING TRANSFER
Nursing Transfer Note      12/3/2020     Transfer  To 1004 from PACU    Transfer via bed    Transfer with     Transported by Patient Escort    Medicines sent:     Chart send with patient: Yes    Notified: spouse

## 2020-12-03 NOTE — PLAN OF CARE
POC reviewed with pt and wife. PT AAOX4. VSS. Tolerating clear liquids. Voidng clear liliana urine. IVFluids in progress at 40cc/hr. PT denies pain at this time. wctm

## 2020-12-03 NOTE — OP NOTE
Date of procedure:   December 3, 2020    Indications for procedure:  66 yo male with diverting loop ileostomy following ultra LAR and ISR CJPAA.  Port in right infraclavicular fossa.      Preoperative diagnosis:   Diverting loop ileostomy, port subcutaneous     Postoperative diagnosis:  same    Name of procedure:  Removal subcutaneous port, closure loop ileostomy    Surgeon:   ENMANUEL Galvin MD    Assistant surgeon:  Rafita Dubose MD and Lucero Arias MD    Type of anesthesia:  GETA    EBL:   minimal  Cc's    Drains:  none    Specimen:  port    Findings:  Subcutaneous port and catheter for identification.      Technique in detail:  The patient was brought to the operating room positive identified and placed on the operating room table in supine position with sequential compression devices on the lower extremities.  The patient had undergone an outpatient oral mechanical and oral antibiotic bowel preparation.  Received intravenous antibiotics.  He underwent general endotracheal anesthesia without complication.    Critical time-out was performed.    Subcutaneous port was removed 1st.  His right upper torso and neck were prepared and draped in the usual fashion.  Previous infraclavicular incision was identified and incised using a 15 scalpel.  Dissection was carried down onto the port using the Bovie.  The cuff was dissected free and the port was delivered from the fibrotic capsule.  Pressure was placed over the tract and the port and catheter were removed and noted to be intact.  The specimen was sent to Surgical pathology for identification.  Tract was suture closed with 3 0 Vicryl.  Hemostasis was assured.  Soft tissues were approximated closing the capsule using Vicryl suture.  The skin was approximated using subcuticular Monocryl.  Skin glue was applied to the incision.  Patient's arms were then outstretched.  This initial operative field was broken down    His stoma appliance was removed.  His abdomen was clipped  of its hair and prepared and draped with Betadine solution.  Mucocutaneous junction was incised using a 15.  Scalpel.  The loop of bowel was dissected free from the anterior abdominal wall under direct vision into the peritoneal cavity using sharp scissor dissection.  The loop of ileum was delivered onto the abdominal wall.  Laparotomy sponges were used to quarantine the wound.  The maricel and stoma was then freshened inverting the bowel for hand-sewn anastomosis.  Single layered interrupted suturing technique was used to approximate the 2 ends of bowel inverting the mucosa.  Three 0 Vicryl suture was employed.    The bowel was reduced into the peritoneal cavity.  Wound was irrigated with saline solution.  The anterior fascia was mobilized away from the subcutaneous tissues for primary repair of the fascial defect.  The posterior sheath and peritoneum were 1st approximated using continuous 0 Vicryl suture.  The anterior sheath was then repaired vertically using interrupted 1 Vicryl suture.  Subcutaneous tissues were irrigated with Betadine saline solution.  Hemostasis was assured.  The skin was approximated using an encircling suture of 0 Vicryl.  Mepilex dressing was applied.    The patient tolerated the procedure well.  There were no complications noted.  The patient was awaken from anesthesia, extubated without incident and transported to the recovery area in stable condition.    I was scrubbed and present for the entire operation.    DEDRICK Galvin MD

## 2020-12-03 NOTE — ANESTHESIA PREPROCEDURE EVALUATION
12/03/2020  Franky Peraza is a 65 y.o., male.    Anesthesia Evaluation    I have reviewed the Patient Summary Reports.         Review of Systems  Anesthesia Hx:  No problems with previous Anesthesia    Social:  Former Smoker    Hematology/Oncology:  Hematology Normal   Oncology Normal     EENT/Dental:EENT/Dental Normal   Cardiovascular:  Cardiovascular Normal     Pulmonary:  Pulmonary Normal    Renal/:  Renal/ Normal     Hepatic/GI:  Hepatic/GI Normal    Musculoskeletal:  Musculoskeletal Normal    Neurological:  Neurology Normal    Endocrine:   Diabetes    Dermatological:  Skin Normal    Psych:  Psychiatric Normal           Physical Exam  General:  Well nourished    Airway/Jaw/Neck:  Airway Findings: Mouth Opening: Normal Tongue: Normal  General Airway Assessment: Adult  Mallampati: II  Improves to II with phonation.  TM Distance: Normal, at least 6 cm  Jaw/Neck Findings:  Neck ROM: Normal ROM      Dental:  Dental Findings: In tact   Chest/Lungs:  Chest/Lungs Findings: Clear to auscultation, Normal Respiratory Rate     Heart/Vascular:  Heart Findings: Rate: Normal  Rhythm: Regular Rhythm  Sounds: Normal             Anesthesia Plan  Type of Anesthesia, risks & benefits discussed:  Anesthesia Type:  general  Patient's Preference: General  Intra-op Monitoring Plan:   Intra-op Monitoring Plan Comments:   Post Op Pain Control Plan:   Post Op Pain Control Plan Comments:   Induction:   IV  Beta Blocker:  Patient is not currently on a Beta-Blocker (No further documentation required).       Informed Consent: Patient understands risks and agrees with Anesthesia plan.  Questions answered. Anesthesia consent signed with patient.  ASA Score: 2     Day of Surgery Review of History & Physical: I have interviewed and examined the patient. I have reviewed the patient's H&P dated:  There are no significant changes.           Ready For Surgery From Anesthesia Perspective.

## 2020-12-03 NOTE — PLAN OF CARE
Alert and oriented.Appears calm and comfortable. Reports pain as tolerable. Denies nausea after sips of water. VSS. Transferred to floor.

## 2020-12-03 NOTE — BRIEF OP NOTE
[facility]  Surgery Department  Brief Op Note    SUMMARY     Date of Procedure: 12/3/2020     Procedure: Procedure(s) (LRB):  CLOSURE, ILEOSTOMY LOOP Remove infusion port, right chest (N/A)  subcutaneous port, right chest (Right)     Surgeon(s) and Role:     * ENMANUEL Galvin MD - Primary     * Leroy Dubose MD - Resident - Assisting     * Lucero Arias MD - Resident - Assisting        Pre-Operative Diagnosis: Rectal cancer [C20]    Post-Operative Diagnosis: Post-Op Diagnosis Codes:     * Rectal cancer [C20]    Anesthesia: General    Technical Procedures Used: Right subclavian port removal.  Ileostomy closure with hand sewn, end to end, anastomosis     Description of the Findings of the Procedure: See op note    Significant Surgical Tasks Conducted by the Assistant(s), if Applicable: N/a    Complications: No    Estimated Blood Loss (EBL): 25mL           Implants: * No implants in log *    Specimens:   Port-a-cath           Condition: Good    Disposition: PACU - hemodynamically stable.    Attestation: I was present and scrubbed for the entire procedure.

## 2020-12-04 VITALS
SYSTOLIC BLOOD PRESSURE: 110 MMHG | HEIGHT: 72 IN | OXYGEN SATURATION: 96 % | TEMPERATURE: 98 F | RESPIRATION RATE: 20 BRPM | WEIGHT: 191.38 LBS | HEART RATE: 67 BPM | BODY MASS INDEX: 25.92 KG/M2 | DIASTOLIC BLOOD PRESSURE: 68 MMHG

## 2020-12-04 LAB
ANION GAP SERPL CALC-SCNC: 7 MMOL/L (ref 8–16)
BASOPHILS # BLD AUTO: 0.03 K/UL (ref 0–0.2)
BASOPHILS NFR BLD: 0.7 % (ref 0–1.9)
BUN SERPL-MCNC: 12 MG/DL (ref 8–23)
CALCIUM SERPL-MCNC: 8.8 MG/DL (ref 8.7–10.5)
CHLORIDE SERPL-SCNC: 108 MMOL/L (ref 95–110)
CO2 SERPL-SCNC: 22 MMOL/L (ref 23–29)
CREAT SERPL-MCNC: 1.1 MG/DL (ref 0.5–1.4)
DIFFERENTIAL METHOD: ABNORMAL
EOSINOPHIL # BLD AUTO: 0.1 K/UL (ref 0–0.5)
EOSINOPHIL NFR BLD: 3.1 % (ref 0–8)
ERYTHROCYTE [DISTWIDTH] IN BLOOD BY AUTOMATED COUNT: 13.3 % (ref 11.5–14.5)
EST. GFR  (AFRICAN AMERICAN): >60 ML/MIN/1.73 M^2
EST. GFR  (NON AFRICAN AMERICAN): >60 ML/MIN/1.73 M^2
FINAL PATHOLOGIC DIAGNOSIS: NORMAL
GLUCOSE SERPL-MCNC: 98 MG/DL (ref 70–110)
GROSS: NORMAL
HCT VFR BLD AUTO: 33.4 % (ref 40–54)
HGB BLD-MCNC: 10.4 G/DL (ref 14–18)
IMM GRANULOCYTES # BLD AUTO: 0.01 K/UL (ref 0–0.04)
IMM GRANULOCYTES NFR BLD AUTO: 0.2 % (ref 0–0.5)
LYMPHOCYTES # BLD AUTO: 0.3 K/UL (ref 1–4.8)
LYMPHOCYTES NFR BLD: 7.5 % (ref 18–48)
Lab: NORMAL
MAGNESIUM SERPL-MCNC: 1.7 MG/DL (ref 1.6–2.6)
MCH RBC QN AUTO: 27.4 PG (ref 27–31)
MCHC RBC AUTO-ENTMCNC: 31.1 G/DL (ref 32–36)
MCV RBC AUTO: 88 FL (ref 82–98)
MONOCYTES # BLD AUTO: 0.4 K/UL (ref 0.3–1)
MONOCYTES NFR BLD: 8.8 % (ref 4–15)
NEUTROPHILS # BLD AUTO: 3.6 K/UL (ref 1.8–7.7)
NEUTROPHILS NFR BLD: 79.7 % (ref 38–73)
NRBC BLD-RTO: 0 /100 WBC
PHOSPHATE SERPL-MCNC: 3 MG/DL (ref 2.7–4.5)
PLATELET # BLD AUTO: 182 K/UL (ref 150–350)
PMV BLD AUTO: 9.3 FL (ref 9.2–12.9)
POCT GLUCOSE: 111 MG/DL (ref 70–110)
POCT GLUCOSE: 92 MG/DL (ref 70–110)
POTASSIUM SERPL-SCNC: 4.2 MMOL/L (ref 3.5–5.1)
RBC # BLD AUTO: 3.79 M/UL (ref 4.6–6.2)
SODIUM SERPL-SCNC: 137 MMOL/L (ref 136–145)
WBC # BLD AUTO: 4.56 K/UL (ref 3.9–12.7)

## 2020-12-04 PROCEDURE — 83735 ASSAY OF MAGNESIUM: CPT

## 2020-12-04 PROCEDURE — 25000003 PHARM REV CODE 250: Performed by: STUDENT IN AN ORGANIZED HEALTH CARE EDUCATION/TRAINING PROGRAM

## 2020-12-04 PROCEDURE — 84100 ASSAY OF PHOSPHORUS: CPT

## 2020-12-04 PROCEDURE — 63600175 PHARM REV CODE 636 W HCPCS: Performed by: STUDENT IN AN ORGANIZED HEALTH CARE EDUCATION/TRAINING PROGRAM

## 2020-12-04 PROCEDURE — 25000003 PHARM REV CODE 250: Performed by: NURSE PRACTITIONER

## 2020-12-04 PROCEDURE — 25000003 PHARM REV CODE 250: Performed by: SURGERY

## 2020-12-04 PROCEDURE — 36415 COLL VENOUS BLD VENIPUNCTURE: CPT

## 2020-12-04 PROCEDURE — 85025 COMPLETE CBC W/AUTO DIFF WBC: CPT

## 2020-12-04 PROCEDURE — 97165 OT EVAL LOW COMPLEX 30 MIN: CPT

## 2020-12-04 PROCEDURE — 80048 BASIC METABOLIC PNL TOTAL CA: CPT

## 2020-12-04 RX ORDER — OXYCODONE AND ACETAMINOPHEN 5; 325 MG/1; MG/1
1 TABLET ORAL EVERY 4 HOURS PRN
Qty: 28 TABLET | Refills: 0 | Status: SHIPPED | OUTPATIENT
Start: 2020-12-04

## 2020-12-04 RX ORDER — ALLOPURINOL 100 MG/1
100 TABLET ORAL 2 TIMES DAILY
Status: DISCONTINUED | OUTPATIENT
Start: 2020-12-04 | End: 2020-12-04 | Stop reason: HOSPADM

## 2020-12-04 RX ORDER — IBUPROFEN 600 MG/1
600 TABLET ORAL EVERY 8 HOURS
Qty: 15 TABLET | Refills: 0 | Status: SHIPPED | OUTPATIENT
Start: 2020-12-04 | End: 2020-12-09

## 2020-12-04 RX ADMIN — IBUPROFEN 800 MG: 800 INJECTION INTRAVENOUS at 06:12

## 2020-12-04 RX ADMIN — MUPIROCIN: 20 OINTMENT TOPICAL at 08:12

## 2020-12-04 RX ADMIN — ALLOPURINOL 100 MG: 100 TABLET ORAL at 08:12

## 2020-12-04 RX ADMIN — ACETAMINOPHEN 1000 MG: 10 INJECTION, SOLUTION INTRAVENOUS at 05:12

## 2020-12-04 RX ADMIN — GABAPENTIN 300 MG: 300 CAPSULE ORAL at 08:12

## 2020-12-04 NOTE — PLAN OF CARE
POC reviewed. AAOx4, VSS on RA with no complaints of SOB, headaches, or dizziness. Urine output per urinal adequate. Clear Liquid diet with no BM and no complaints of N/V. Abdominal pain related to ostomy closure, well controlled with ordered pain medications. Blood glucose monitored ACHS at bedtime with no insulin coverage needed. Resting quietly with side rails up and call light with in reach. Continuing to monitor patient status.

## 2020-12-04 NOTE — ASSESSMENT & PLAN NOTE
65 y.o. male with history of low rectal CA  who received  ultra LAR with ISR CJPAA and DLI 9-3-2020, now s/p ileostomy closure 12/03.     - Advance to FLD  - Awaiting return of bowel function   - Continue IVF  - Multimodal pain control  - Home meds reviewed, restarted  - SSI   - DVT: SCD, lovenox  - PT/OT consulted, OOB ambulation encouraged

## 2020-12-04 NOTE — PLAN OF CARE
POC reviewed with pt and spouse. AAOx4, VSS. RA. D/c today. Advanced to full liquid diet and then to gi soft. Tolerated well. Ate 100% of gi soft, no n/v. Still no BM, only passing gas. IV removed. AVS reviewed with pt and spouse. Ileostomy closure site with foam dressing. D/c meds delivered to bedside.

## 2020-12-04 NOTE — DISCHARGE SUMMARY
[facility]  General Surgery  Discharge Summary      Patient Name: Franky Peraza  MRN: [id number]  Admission Date: 12/3/2020  Hospital Length of Stay: 1 days  Discharge Date and Time:  12/04/2020 11:15 AM  Attending Physician: ENMANUEL Galvin MD   Discharging Provider: Lucero Arias MD  Primary Care Provider: Primary Doctor No    HPI:    65 y.o. male with history of low rectal CA who received 9-3-2020 ultra LAR with ISR CJPAA and DLI. He presented for ileostomy reversal.     Procedure(s) (LRB):  CLOSURE, ILEOSTOMY LOOP Remove infusion port, right chest (N/A)  subcutaneous port, right chest (Right)      Indwelling Lines/Drains at time of discharge:   Lines/Drains/Airways     Drain                 Closed/Suction Drain 09/03/20 1400 Left LLQ Bulb 19 Fr. 91 days         Ileostomy 09/03/20 1425 Loop RLQ 91 days              Hospital Course: Mr. Peraza was admitted 12/03/220 for planned ileostomy reversal which was completed without complication. He did well in the immediate psot-operative period and was advanced to a regular low residue diet POD 1. He tolerated this well and was discharged POD 2 tolerating a regular diet, passing flatus, with pain well controlled and planned follow up with Dr. Galvin in clinic.     Consults:     Significant Diagnostic Studies:   Specimen (12h ago, onward)    None          Pending Diagnostic Studies:     Procedure Component Value Units Date/Time    Specimen to Pathology, Surgery Other (Colorectal) [[id number]] Collected: 12/03/20 0838    Order Status: Sent Lab Status: In process Updated: 12/03/20 1153        Final Active Diagnoses:    Diagnosis Date Noted POA    PRINCIPAL PROBLEM:  Ileostomy in place [Z93.2] 12/03/2020 Not Applicable      Problems Resolved During this Admission:      Discharged Condition: good    Disposition: Home or Self Care    Follow Up:  Follow-up Information     DEDRICK Galvin MD In 2 weeks.    Specialty: Colon and Rectal Surgery  Contact  information:  [address]  [facility] [address]  [phone]                 Patient Instructions:      Diet Dysphagia Soft   Order Comments: Stay on a very soft low fiber diet for 5 days (until 12/09/2020). Then you may eat more solid food.     Lifting restrictions   Order Comments: No lifting objects over 30 lbs for 6 weeks     Notify your health care provider if you experience any of the following:  temperature >100.4     Notify your health care provider if you experience any of the following:  persistent nausea and vomiting or diarrhea     Notify your health care provider if you experience any of the following:  severe uncontrolled pain     Notify your health care provider if you experience any of the following:  redness, tenderness, or signs of infection (pain, swelling, redness, odor or green/yellow discharge around incision site)     Notify your health care provider if you experience any of the following:  persistent dizziness, light-headedness, or visual disturbances     Notify your health care provider if you experience any of the following:  increased confusion or weakness     Change dressing (specify)   Order Comments: You may change your dressing as needed for drainage. Keep it clean and dry. You can use gauze and tape as a dressing.     Activity as tolerated     Shower on day dressing removed (No bath)   Order Comments: You may shower daily. Do not submerge or soak incision. Do not scrub incision. You may allow water and gentle soap to run over the incision while showering.     Medications:  Reconciled Home Medications:      Medication List      START taking these medications    oxyCODONE-acetaminophen 5-325 mg per tablet  Commonly known as: PERCOCET  Take 1 tablet by mouth every 4 (four) hours as needed for Pain.        CHANGE how you take these medications    * ibuprofen 800 MG tablet  Commonly known as: ADVIL,MOTRIN  Take 1 tablet (800 mg total) by mouth every 8 (eight) hours as needed for  Other (for pain).  What changed: Another medication with the same name was added. Make sure you understand how and when to take each.     * ibuprofen 600 MG tablet  Commonly known as: ADVIL,MOTRIN  Take 1 tablet (600 mg total) by mouth every 8 (eight) hours. for 5 days  What changed: You were already taking a medication with the same name, and this prescription was added. Make sure you understand how and when to take each.         * This list has 2 medication(s) that are the same as other medications prescribed for you. Read the directions carefully, and ask your doctor or other care provider to review them with you.            CONTINUE taking these medications    allopurinoL 100 MG tablet  Commonly known as: ZYLOPRIM  Take 100 mg by mouth 2 (two) times a day.     loperamide 2 mg capsule  Commonly known as: IMODIUM  Take 2 mg by mouth 2 (two) times a day.     metFORMIN 500 MG tablet  Commonly known as: GLUCOPHAGE  Take 500 mg by mouth 2 (two) times daily with meals.     oxyCODONE 5 MG immediate release tablet  Commonly known as: ROXICODONE  Take 1 tablet (5 mg total) by mouth every 4 (four) hours as needed for Pain.     PAIN RELIEF EXTRA STRENGTH 500 MG tablet  Generic drug: acetaminophen  Take 1 tablet (500 mg total) by mouth every 6 (six) hours as needed for Pain.     polyethylene glycol 17 gram/dose powder  Commonly known as: GLYCOLAX  3pm - Day before procedure: Drink 8-8 oz glasses of clear liquid with one cap of Miralax in each glass.  (One every 15-20 minutes.) If bowels are not clear - repeat 2-3 more caps until clear.        STOP taking these medications    metroNIDAZOLE 500 MG tablet  Commonly known as: FLAGYL     neomycin 500 mg Tab  Commonly known as: MYCIFRADIN          Time spent on the discharge of patient: 30 minutes    Lucero Arias MD  General Surgery  [facility]

## 2020-12-04 NOTE — HPI
65 y.o. male with history of low rectal CA who received 9-3-2020 ultra LAR with ISR CJPAA and DLI. He presented for ileostomy reversal.

## 2020-12-04 NOTE — PROGRESS NOTES
[facility]  General Surgery  Progress Note    Subjective:     History of Present Illness:  No notes on file    Post-Op Info:  Procedure(s) (LRB):  CLOSURE, ILEOSTOMY LOOP Remove infusion port, right chest (N/A)  subcutaneous port, right chest (Right)   1 Day Post-Op     Interval History: NAEON. Tolerated CLD without nausea, although complains of broth options. No BM or flatus yet.     Medications:  Continuous Infusions:   sodium chloride 0.9% 40 mL/hr (12/03/20 0921)     Scheduled Meds:   acetaminophen  1,000 mg Oral Q8H    enoxaparin  40 mg Subcutaneous Q24H    gabapentin  300 mg Oral TID    ibuprofen  800 mg Intravenous Q8H    Followed by    ibuprofen  800 mg Oral Q8H    mupirocin   Nasal BID     PRN Meds:dextrose 50%, dextrose 50%, glucagon (human recombinant), glucose, glucose, insulin aspart U-100, oxyCODONE, oxyCODONE, sodium chloride 0.9%, sodium chloride 0.9%, traMADoL     Review of patient's allergies indicates:  No Known Allergies  Objective:     Vital Signs (Most Recent):  Temp: 97.5 °F (36.4 °C) (12/04/20 0453)  Pulse: 66 (12/04/20 0453)  Resp: 18 (12/04/20 0453)  BP: 113/70 (12/04/20 0453)  SpO2: 98 % (12/04/20 0453) Vital Signs (24h Range):  Temp:  [97 °F (36.1 °C)-98 °F (36.7 °C)] 97.5 °F (36.4 °C)  Pulse:  [63-80] 66  Resp:  [12-20] 18  SpO2:  [92 %-100 %] 98 %  BP: (103-123)/(59-72) 113/70     Weight: 86.8 kg (191 lb 5.8 oz)  Body mass index is 25.95 kg/m².    Intake/Output - Last 3 Shifts       12/02 0700 - 12/03 0659 12/03 0700 - 12/04 0659 12/04 0700 - 12/05 0659    P.O.  620     I.V. (mL/kg)  1640 (18.9)     IV Piggyback  350     Total Intake(mL/kg)  2610 (30.1)     Urine (mL/kg/hr)  1700 (0.8)     Stool  0     Total Output  1700     Net  +910            Stool Occurrence  0 x           Physical Exam  Constitutional:       Appearance: Normal appearance.   HENT:      Head: Normocephalic and atraumatic.   Cardiovascular:      Rate and Rhythm: Normal rate and regular  rhythm.   Pulmonary:      Effort: Pulmonary effort is normal. No respiratory distress.   Abdominal:      General: Abdomen is flat.      Palpations: Abdomen is soft.      Comments: RLQ prior ostomy site with ss drainage on dressing    Skin:     General: Skin is warm and dry.   Neurological:      General: No focal deficit present.      Mental Status: He is alert and oriented to person, place, and time.   Psychiatric:         Mood and Affect: Mood normal.         Behavior: Behavior normal.         Significant Labs:  CBC, CMP Pending           Assessment/Plan:     * Ileostomy in place  65 y.o. male with history of low rectal CA  who received  ultra LAR with ISR CJPAA and DLI 9-3-2020, now s/p ileostomy closure 12/03.     - Advance to FLD  - Awaiting return of bowel function   - Continue IVF  - Multimodal pain control  - Home meds reviewed, restarted  - SSI   - DVT: SCD, lovenox  - PT/OT consulted, OOB ambulation encouraged              Lucero Arias MD  General Surgery  [facility]

## 2020-12-04 NOTE — PLAN OF CARE
Admit Date:  12/3/2020  4:49 AM      Admit Diagnosis:  Rectal cancer [C20]  Ileostomy in place [Z93.2]  Ileostomy in place [Z93.2]  Ileostomy in place [Z93.2]      Past Medical History:   Diagnosis Date    Diabetes mellitus        CM met with patient in room for Discharge Planning Assessment. Per patient, he lives with his spouse in a house with 0 step(s) to enter.   Per patient, he was independent with ADLS and did not use DME for ambulation.  Patient will have assistance from spouse upon discharge.   Discharge Planning Booklet given to patient and discussed.  All questions addressed.  CM will follow for needs.     12/04/20 1046   Discharge Assessment   Assessment Type Discharge Planning Assessment   Confirmed/corrected address and phone number on facesheet? Yes   Assessment information obtained from? Patient   Expected Length of Stay (days) 2   Communicated expected length of stay with patient/caregiver yes   Prior to hospitilization cognitive status: Alert/Oriented   Prior to hospitalization functional status: Independent   Current cognitive status: Alert/Oriented   Current Functional Status: Independent   Lives With spouse   Is patient able to care for self after discharge? Yes   Patient's perception of discharge disposition home or selfcare   Readmission Within the Last 30 Days no previous admission in last 30 days   Patient currently being followed by outpatient case management? No   Patient currently receives any other outside agency services? No   Equipment Currently Used at Home none   Do you have any problems affording any of your prescribed medications? No   Is the patient taking medications as prescribed? yes   Does the patient have transportation home? Yes   Transportation Anticipated family or friend will provide   Does the patient receive services at the [facility]? No   Discharge Plan A Home with family   Discharge Plan B Home with family   DME Needed Upon Discharge  none   Patient/Family in  Agreement with Plan yes            PCP:  Primary Doctor No - JULIAN CowanP with [facility] in [place]   None        Pharmacy:    [facility] - [place] [facility] [address]  [address]  [place] [address]  Phone: [phone] Fax: [phone]        Emergency Contacts:  Extended Emergency Contact Information  Primary Emergency Contact: del duffy  Address: [address] [place] [address]           [place] [address] [facility]  Home Phone: [phone]  Mobile Phone: [phone]  Relation: Spouse  Preferred language: English   needed? No      Insurance:    Payor: HU[employer] MANAGED MEDICARE / Plan: [employer] HE[employer] HMO SNP / Product Type: Medicare Advantage /       12/04/2020  10:52 AM    Aislinn Duarte RN, CM   Ext: [phone]

## 2020-12-04 NOTE — PLAN OF CARE
Patient will be discharged home with no needs.      12/04/20 1214   Final Note   Assessment Type Final Discharge Note   Anticipated Discharge Disposition Home   Hospital Follow Up  Appt(s) scheduled? No  (InCodefastet message was sent to clinic nurse to schedule)   Discharge plans and expectations educations in teach back method with documentation complete? Yes   Right Care Referral Info   Post Acute Recommendation No Care   Post-Acute Status   Post-Acute Authorization Other   Other Status No Post-Acute Service Needs   Aislinn Duarte RN, CM   Ext: [phone]

## 2020-12-04 NOTE — PLAN OF CARE
Problem: Occupational Therapy Goal  Goal: Occupational Therapy Goal  Outcome: Met    OT evaluation completed. Patient presents at [facility]. No further OT services needed at this time.  Poornima Sandoval OT  12/4/2020

## 2020-12-04 NOTE — PT/OT/SLP EVAL
Occupational Therapy   Evaluation and Discharge Note    Name: Franky Peraza  MRN: [id number]  Admitting Diagnosis:  Ileostomy in place 1 Day Post-Op   Procedure(s):  CLOSURE, ILEOSTOMY LOOP Remove infusion port, right chest  subcutaneous port, right chest     Recommendations:     Discharge Recommendations: home  Discharge Equipment Recommendations:  none  Barriers to discharge:  None    Assessment:     Franky Peraza is a 65 y.o. male with a medical diagnosis of Ileostomy in place. At this time, patient is functioning at his prior level of function and does not require further acute OT services.     Plan:     During this hospitalization, patient does not require further acute OT services.  Please re-consult if situation changes.    · Plan of Care Reviewed with: patient, spouse    Subjective     Chief Complaint: None at this time  Patient/Family Comments/goals: To go home    Occupational Profile:  Living Environment: Patient lives with his wife in a [facility] with 0 ZUNILDA. His bathrooms have a WIS with a built-in bench and a tub/shower with patient predominantly using the WIS.   Previous level of function: Independent  Roles and Routines: . Patient drives and is retired. 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  Equipment Used at home:  bath bench(walking stick)  Assistance upon Discharge: Wife    Pain/Comfort:  · Pain Rating 1: 0/10    Patients cultural, spiritual, Baptism conflicts given the current situation: no    Objective:     Communicated with: RN prior to session.  Patient found HOB elevated with peripheral IV upon OT entry to room.    General Precautions: Standard, fall   Orthopedic Precautions:N/A   Braces: N/A     Occupational Performance:    Bed Mobility:    · Patient completed Supine to Sit to R side EOB with independence  · Patient completed Scooting anteriorly to EOB for foot placement on floor with independence  · Patient completed Sit to Supine with independence    Functional  Mobility/Transfers:  · Patient completed Sit <> Stand Transfer with independence with no assistive device   · Functional Mobility: ~150' independently with no AD, patient managed IV pole, no LOB/SOB noted    Activities of Daily Living:  · Denied need for ADLs    Cognitive/Visual Perceptual:  Cognitive/Psychosocial Skills:     -       Oriented to: Person, Place, Time and Situation   -       Follows Commands/attention:Follows multistep  commands    Physical Exam:  Upper Extremity Range of Motion:     -       Right Upper Extremity: WFL  -       Left Upper Extremity: WFL  Upper Extremity Strength:    -       Right Upper Extremity: WFL  -       Left Upper Extremity: WFL    AMPAC 6 Click ADL:  AMPAC Total Score: 24    Treatment & Education:  Role of OT/evaluation  Educated on importance of sitting up in the chair/OOB activity with staff assistance while in acute setting to prevent debility  Discussed d/c from skilled OT services with patient and wife in agreement - no concerns/questions within OT scope of practice  Call button for assistance  Education:    Patient left HOB elevated with all lines intact, call button in reach, RN notified and wife present    GOALS:   Multidisciplinary Problems     Occupational Therapy Goals     Not on file          Multidisciplinary Problems (Resolved)        Problem: Occupational Therapy Goal    Goal Priority Disciplines Outcome Interventions   Occupational Therapy Goal   (Resolved)     OT, PT/OT Met                    History:     Past Medical History:   Diagnosis Date    Diabetes mellitus        Past Surgical History:   Procedure Laterality Date    CATARACT EXTRACTION Bilateral     FLEXIBLE SIGMOIDOSCOPY N/A 8/11/2020    Procedure: SIGMOIDOSCOPY, FLEXIBLE;  Surgeon: ENMANUEL Galvin MD;  Location: [address];  Service: Endoscopy;  Laterality: N/A;  RAPID - Pt lives 2 hours away - ERW    FLEXIBLE SIGMOIDOSCOPY N/A 9/3/2020    Procedure: SIGMOIDOSCOPY, FLEXIBLE;  Surgeon: ENMANUEL  Odin Galvin MD;  Location: [facility] OR 2ND FLR;  Service: Colon and Rectal;  Laterality: N/A;    FLEXIBLE SIGMOIDOSCOPY N/A 11/17/2020    Procedure: SIGMOIDOSCOPY, FLEXIBLE;  Surgeon: ENMANUEL Galvin MD;  Location: [facility] ENDO (4TH FLR);  Service: Endoscopy;  Laterality: N/A;  rapid covid-140 miles from a testing site-tb    ILEOSTOMY CLOSURE N/A 12/3/2020    Procedure: CLOSURE, ILEOSTOMY LOOP Remove infusion port, right chest;  Surgeon: ENMANUEL Galvin MD;  Location: [facility] OR 2ND FLR;  Service: Colon and Rectal;  Laterality: N/A;    LOW ANTERIOR RESECTION OF COLON N/A 9/3/2020    Procedure: RESECTION, COLON, LOW ANTERIOR;  Surgeon: ENMANUEL Galvin MD;  Location: [facility] OR 2ND FLR;  Service: Colon and Rectal;  Laterality: N/A;    MOBILIZATION OF SPLENIC FLEXURE N/A 9/3/2020    Procedure: MOBILIZATION, SPLENIC FLEXURE;  Surgeon: ENMANUEL Galvin MD;  Location: [facility] OR 2ND FLR;  Service: Colon and Rectal;  Laterality: N/A;    REMOVAL OF INFUSION PUMP Right 12/3/2020    Procedure: subcutaneous port, right chest;  Surgeon: ENMANUEL Galvin MD;  Location: [facility] OR 2ND FLR;  Service: Colon and Rectal;  Laterality: Right;    TONSILLECTOMY         Time Tracking:     OT Date of Treatment: 12/04/20  OT Start Time: 0848  OT Stop Time: 0858  OT Total Time (min): 10 min    Billable Minutes:Evaluation 10 minutes    Poornima Sandoval OT  12/4/2020

## 2020-12-04 NOTE — SUBJECTIVE & OBJECTIVE
Interval History: NAEON. Tolerated CLD without nausea, although complains of broth options. No BM or flatus yet.     Medications:  Continuous Infusions:   sodium chloride 0.9% 40 mL/hr (12/03/20 0921)     Scheduled Meds:   acetaminophen  1,000 mg Oral Q8H    enoxaparin  40 mg Subcutaneous Q24H    gabapentin  300 mg Oral TID    ibuprofen  800 mg Intravenous Q8H    Followed by    ibuprofen  800 mg Oral Q8H    mupirocin   Nasal BID     PRN Meds:dextrose 50%, dextrose 50%, glucagon (human recombinant), glucose, glucose, insulin aspart U-100, oxyCODONE, oxyCODONE, sodium chloride 0.9%, sodium chloride 0.9%, traMADoL     Review of patient's allergies indicates:  No Known Allergies  Objective:     Vital Signs (Most Recent):  Temp: 97.5 °F (36.4 °C) (12/04/20 0453)  Pulse: 66 (12/04/20 0453)  Resp: 18 (12/04/20 0453)  BP: 113/70 (12/04/20 0453)  SpO2: 98 % (12/04/20 0453) Vital Signs (24h Range):  Temp:  [97 °F (36.1 °C)-98 °F (36.7 °C)] 97.5 °F (36.4 °C)  Pulse:  [63-80] 66  Resp:  [12-20] 18  SpO2:  [92 %-100 %] 98 %  BP: (103-123)/(59-72) 113/70     Weight: 86.8 kg (191 lb 5.8 oz)  Body mass index is 25.95 kg/m².    Intake/Output - Last 3 Shifts       12/02 0700 - 12/03 0659 12/03 0700 - 12/04 0659 12/04 0700 - 12/05 0659    P.O.  620     I.V. (mL/kg)  1640 (18.9)     IV Piggyback  350     Total Intake(mL/kg)  2610 (30.1)     Urine (mL/kg/hr)  1700 (0.8)     Stool  0     Total Output  1700     Net  +910            Stool Occurrence  0 x           Physical Exam  Constitutional:       Appearance: Normal appearance.   HENT:      Head: Normocephalic and atraumatic.   Cardiovascular:      Rate and Rhythm: Normal rate and regular rhythm.   Pulmonary:      Effort: Pulmonary effort is normal. No respiratory distress.   Abdominal:      General: Abdomen is flat.      Palpations: Abdomen is soft.      Comments: RLQ prior ostomy site with ss drainage on dressing    Skin:     General: Skin is warm and dry.   Neurological:       General: No focal deficit present.      Mental Status: He is alert and oriented to person, place, and time.   Psychiatric:         Mood and Affect: Mood normal.         Behavior: Behavior normal.         Significant Labs:  CBC, CMP Pending

## 2020-12-04 NOTE — HOSPITAL COURSE
Mr. Peraza was admitted 12/03/220 for planned ileostomy reversal which was completed without complication. He did well in the immediate psot-operative period and was advanced to a regular low residue diet POD 1. He tolerated this well and was discharged POD 2 tolerating a regular diet, passing flatus, with pain well controlled and planned follow up with Dr. Galvin in clinic.

## 2020-12-07 ENCOUNTER — PATIENT MESSAGE (OUTPATIENT)
Dept: SURGERY | Facility: CLINIC | Age: 65
End: 2020-12-07

## 2020-12-29 NOTE — PROGRESS NOTES
HPI:  Franky Peraza is a 65 y.o. male with history of DLI closure 3 weeks ago.  Prior LAR, ISR with [facility].      Feels well.  2-4 BMs per day.  Continent.  No urgency.  Occasional fragmented stools.         Past Medical History:   Diagnosis Date    Diabetes mellitus         Past Surgical History:   Procedure Laterality Date    CATARACT EXTRACTION Bilateral     FLEXIBLE SIGMOIDOSCOPY N/A 8/11/2020    Procedure: SIGMOIDOSCOPY, FLEXIBLE;  Surgeon: ENMANUEL Galvin MD;  Location: [facility] ENDO (4TH FLR);  Service: Endoscopy;  Laterality: N/A;  RAPID - Pt lives 2 hours away - ERW    FLEXIBLE SIGMOIDOSCOPY N/A 9/3/2020    Procedure: SIGMOIDOSCOPY, FLEXIBLE;  Surgeon: ENMANUEL Galvin MD;  Location: [facility] OR 2ND FLR;  Service: Colon and Rectal;  Laterality: N/A;    FLEXIBLE SIGMOIDOSCOPY N/A 11/17/2020    Procedure: SIGMOIDOSCOPY, FLEXIBLE;  Surgeon: ENMANUEL Galvin MD;  Location: [facility] ENDO (4TH FLR);  Service: Endoscopy;  Laterality: N/A;  rapid covid-140 miles from a testing site-tb    ILEOSTOMY CLOSURE N/A 12/3/2020    Procedure: CLOSURE, ILEOSTOMY LOOP Remove infusion port, right chest;  Surgeon: ENMANUEL Galvin MD;  [facility];  Service: Colon and Rectal;  Laterality: N/A;    LOW ANTERIOR RESECTION OF COLON N/A 9/3/2020    Procedure: RESECTION, COLON, LOW ANTERIOR;  Surgeon: ENMANUEL Galvin MD;  Location: [facility] OR [facility] FLR;  Service: Colon and Rectal;  Laterality: N/A;    MOBILIZATION OF SPLENIC FLEXURE N/A 9/3/2020    Procedure: MOBILIZATION, SPLENIC FLEXURE;  Surgeon: ENMANUEL Galvin MD;  [facility];  Service: Colon and Rectal;  Laterality: N/A;    REMOVAL OF INFUSION PUMP Right 12/3/2020    Procedure: subcutaneous port, right chest;  Surgeon: ENMANUEL Galvin MD;  Location: [facility] OR 2ND FLR;  Service: Colon and Rectal;  Laterality: Right;    TONSILLECTOMY         Review of patient's allergies indicates:  No Known Allergies    Family History   Problem Relation Age of Onset    Cancer  Mother     Cancer Maternal Uncle        Social History     Socioeconomic History    Marital status:      Spouse name: Not on file    Number of children: Not on file    Years of education: Not on file    Highest education level: Not on file   Occupational History    Not on file   Social Needs    Financial resource strain: Not on file    Food insecurity     Worry: Not on file     Inability: Not on file    Transportation needs     Medical: Not on file     Non-medical: Not on file   Tobacco Use    Smoking status: Former Smoker    Smokeless tobacco: Never Used   Substance and Sexual Activity    Alcohol use: Never     Frequency: Never    Drug use: Never    Sexual activity: Yes   Lifestyle    Physical activity     Days per week: Not on file     Minutes per session: Not on file    Stress: Not on file   Relationships    Social connections     Talks on phone: Not on file     Gets together: Not on file     Attends Yazidi service: Not on file     Active member of club or organization: Not on file     Attends meetings of clubs or organizations: Not on file     Relationship status: Not on file   Other Topics Concern    Not on file   Social History Narrative    Not on file       ROS:  GENERAL: No fever, chills, fatigability or weight loss.  Integument: No rashes, redness, icterus  CHEST: Denies MCGARRY, cyanosis, wheezing, cough and sputum production.  CARDIOVASCULAR: Denies chest pain, PND, orthopnea or reduced exercise tolerance.  GI: Denies abd pain, dysphagia, nausea, vomiting, no hematemesis   : Denies burning on urination, no hematuria, no bacteriuria  MSK: No deformities, swelling, joint pain swelling  Neurologic: No HAs, seizures, weakness, paresthesias, gait problems    PE:  General appearance well  BP (!) 167/91 (BP Location: Right arm, Patient Position: Sitting, BP Method: Large (Automatic))   Pulse 77   Ht 6' (1.829 m)   Wt 91.4 kg (201 lb 8 oz)   BMI 27.33 kg/m²   Sclera/ Skin  anicteric  LN none palpable  AT NC EOMI  Neck supple trachea midline   Chest symmetric, nl excursion, no retractions, breathing comfortably  Abdomen wound healed  ND soft NT.  no masses, no organomegaly  EXT - no CCE  Neuro:  Mood/ affect nl, alert and oriented x 3, moves all ext's, gait nl    Assessment:  Wound healed  Good GI function  Continent of stool    Plan:  OV 3 months.    Follow up medical oncology.

## 2020-12-30 ENCOUNTER — OFFICE VISIT (OUTPATIENT)
Dept: SURGERY | Facility: CLINIC | Age: 65
End: 2020-12-30
Payer: MEDICARE

## 2020-12-30 VITALS
DIASTOLIC BLOOD PRESSURE: 91 MMHG | HEIGHT: 72 IN | HEART RATE: 77 BPM | SYSTOLIC BLOOD PRESSURE: 167 MMHG | WEIGHT: 201.5 LBS | BODY MASS INDEX: 27.29 KG/M2

## 2020-12-30 DIAGNOSIS — Z48.89 ENCOUNTER FOR POST SURGICAL WOUND CHECK: Primary | ICD-10-CM

## 2020-12-30 PROCEDURE — 3008F BODY MASS INDEX DOCD: CPT | Mod: CPTII,S$GLB,, | Performed by: COLON & RECTAL SURGERY

## 2020-12-30 PROCEDURE — 99024 POSTOP FOLLOW-UP VISIT: CPT | Mod: S$GLB,,, | Performed by: COLON & RECTAL SURGERY

## 2020-12-30 PROCEDURE — 1126F PR PAIN SEVERITY QUANTIFIED, NO PAIN PRESENT: ICD-10-PCS | Mod: S$GLB,,, | Performed by: COLON & RECTAL SURGERY

## 2020-12-30 PROCEDURE — 1101F PT FALLS ASSESS-DOCD LE1/YR: CPT | Mod: CPTII,S$GLB,, | Performed by: COLON & RECTAL SURGERY

## 2020-12-30 PROCEDURE — 3288F PR FALLS RISK ASSESSMENT DOCUMENTED: ICD-10-PCS | Mod: CPTII,S$GLB,, | Performed by: COLON & RECTAL SURGERY

## 2020-12-30 PROCEDURE — 1101F PR PT FALLS ASSESS DOC 0-1 FALLS W/OUT INJ PAST YR: ICD-10-PCS | Mod: CPTII,S$GLB,, | Performed by: COLON & RECTAL SURGERY

## 2020-12-30 PROCEDURE — 1126F AMNT PAIN NOTED NONE PRSNT: CPT | Mod: S$GLB,,, | Performed by: COLON & RECTAL SURGERY

## 2020-12-30 PROCEDURE — 3008F PR BODY MASS INDEX (BMI) DOCUMENTED: ICD-10-PCS | Mod: CPTII,S$GLB,, | Performed by: COLON & RECTAL SURGERY

## 2020-12-30 PROCEDURE — 99999 PR PBB SHADOW E&M-EST. PATIENT-LVL III: CPT | Mod: PBBFAC,,, | Performed by: COLON & RECTAL SURGERY

## 2020-12-30 PROCEDURE — 99024 PR POST-OP FOLLOW-UP VISIT: ICD-10-PCS | Mod: S$GLB,,, | Performed by: COLON & RECTAL SURGERY

## 2020-12-30 PROCEDURE — 3288F FALL RISK ASSESSMENT DOCD: CPT | Mod: CPTII,S$GLB,, | Performed by: COLON & RECTAL SURGERY

## 2020-12-30 PROCEDURE — 99999 PR PBB SHADOW E&M-EST. PATIENT-LVL III: ICD-10-PCS | Mod: PBBFAC,,, | Performed by: COLON & RECTAL SURGERY

## 2020-12-30 NOTE — LETTER
December 30, 2020        Krystle Staples MD  [address]  [place] [address]             [facility]  [address]  [place] [address]  Phone: [phone]  Fax: [phone]   Patient: Franky Peraza   MR Number: [id number]   YOB: 1955   Date of Visit: 12/30/2020       Dear Dr. Staples:    Happy New Year!  I hope you are well.  Franky is doing well following his ileostomy closure with good fecal continence.  I am very happy for him.  He will RTC  In 3 months.  I told him that you would check his blood work and imaging for surveillance.  I will perform flexible sigmoidoscopy for surveillance.      Do not hesitate to call if you have any questions.  Thank you Krystle.      Sincerely,      ENMANUEL Galvin MD            CC  No Recipients    Enclosure

## 2021-02-24 ENCOUNTER — PATIENT MESSAGE (OUTPATIENT)
Dept: RESEARCH | Facility: HOSPITAL | Age: 66
End: 2021-02-24

## 2021-04-14 ENCOUNTER — OFFICE VISIT (OUTPATIENT)
Dept: SURGERY | Facility: CLINIC | Age: 66
End: 2021-04-14
Payer: MEDICARE

## 2021-04-14 VITALS
SYSTOLIC BLOOD PRESSURE: 189 MMHG | HEART RATE: 69 BPM | HEIGHT: 72 IN | WEIGHT: 197.69 LBS | BODY MASS INDEX: 26.78 KG/M2 | DIASTOLIC BLOOD PRESSURE: 91 MMHG

## 2021-04-14 DIAGNOSIS — C26.9 MALIGNANT NEOPLASM OF ILL-DEFINED SITES WITHIN THE DIGESTIVE SYSTEM: ICD-10-CM

## 2021-04-14 DIAGNOSIS — Z85.048 ENCOUNTER FOR FOLLOW-UP SURVEILLANCE OF RECTAL CANCER: Primary | ICD-10-CM

## 2021-04-14 DIAGNOSIS — Z08 ENCOUNTER FOR FOLLOW-UP SURVEILLANCE OF RECTAL CANCER: Primary | ICD-10-CM

## 2021-04-14 PROCEDURE — 99999 PR PBB SHADOW E&M-EST. PATIENT-LVL III: CPT | Mod: PBBFAC,,, | Performed by: COLON & RECTAL SURGERY

## 2021-04-14 PROCEDURE — 1159F PR MEDICATION LIST DOCUMENTED IN MEDICAL RECORD: ICD-10-PCS | Mod: S$GLB,,, | Performed by: COLON & RECTAL SURGERY

## 2021-04-14 PROCEDURE — 3288F FALL RISK ASSESSMENT DOCD: CPT | Mod: CPTII,S$GLB,, | Performed by: COLON & RECTAL SURGERY

## 2021-04-14 PROCEDURE — 3288F PR FALLS RISK ASSESSMENT DOCUMENTED: ICD-10-PCS | Mod: CPTII,S$GLB,, | Performed by: COLON & RECTAL SURGERY

## 2021-04-14 PROCEDURE — 3008F PR BODY MASS INDEX (BMI) DOCUMENTED: ICD-10-PCS | Mod: CPTII,S$GLB,, | Performed by: COLON & RECTAL SURGERY

## 2021-04-14 PROCEDURE — 1126F AMNT PAIN NOTED NONE PRSNT: CPT | Mod: S$GLB,,, | Performed by: COLON & RECTAL SURGERY

## 2021-04-14 PROCEDURE — 1126F PR PAIN SEVERITY QUANTIFIED, NO PAIN PRESENT: ICD-10-PCS | Mod: S$GLB,,, | Performed by: COLON & RECTAL SURGERY

## 2021-04-14 PROCEDURE — 1101F PR PT FALLS ASSESS DOC 0-1 FALLS W/OUT INJ PAST YR: ICD-10-PCS | Mod: CPTII,S$GLB,, | Performed by: COLON & RECTAL SURGERY

## 2021-04-14 PROCEDURE — 99213 OFFICE O/P EST LOW 20 MIN: CPT | Mod: S$GLB,,, | Performed by: COLON & RECTAL SURGERY

## 2021-04-14 PROCEDURE — 99213 PR OFFICE/OUTPT VISIT, EST, LEVL III, 20-29 MIN: ICD-10-PCS | Mod: S$GLB,,, | Performed by: COLON & RECTAL SURGERY

## 2021-04-14 PROCEDURE — 99999 PR PBB SHADOW E&M-EST. PATIENT-LVL III: ICD-10-PCS | Mod: PBBFAC,,, | Performed by: COLON & RECTAL SURGERY

## 2021-04-14 PROCEDURE — 1101F PT FALLS ASSESS-DOCD LE1/YR: CPT | Mod: CPTII,S$GLB,, | Performed by: COLON & RECTAL SURGERY

## 2021-04-14 PROCEDURE — 3008F BODY MASS INDEX DOCD: CPT | Mod: CPTII,S$GLB,, | Performed by: COLON & RECTAL SURGERY

## 2021-04-14 PROCEDURE — 1159F MED LIST DOCD IN RCRD: CPT | Mod: S$GLB,,, | Performed by: COLON & RECTAL SURGERY

## 2021-04-15 ENCOUNTER — TELEPHONE (OUTPATIENT)
Dept: SURGERY | Facility: CLINIC | Age: 66
End: 2021-04-15

## 2021-04-16 ENCOUNTER — PATIENT MESSAGE (OUTPATIENT)
Dept: ENDOSCOPY | Facility: HOSPITAL | Age: 66
End: 2021-04-16

## 2021-04-16 ENCOUNTER — TELEPHONE (OUTPATIENT)
Dept: SURGERY | Facility: CLINIC | Age: 66
End: 2021-04-16

## 2021-04-16 DIAGNOSIS — Z12.11 SPECIAL SCREENING FOR MALIGNANT NEOPLASMS, COLON: Primary | ICD-10-CM

## 2021-04-16 RX ORDER — SODIUM, POTASSIUM,MAG SULFATES 17.5-3.13G
1 SOLUTION, RECONSTITUTED, ORAL ORAL DAILY
Qty: 1 KIT | Refills: 0 | Status: SHIPPED | OUTPATIENT
Start: 2021-04-16 | End: 2021-04-18

## 2021-04-21 ENCOUNTER — PATIENT MESSAGE (OUTPATIENT)
Dept: ENDOSCOPY | Facility: HOSPITAL | Age: 66
End: 2021-04-21

## 2021-04-22 ENCOUNTER — PATIENT MESSAGE (OUTPATIENT)
Dept: ENDOSCOPY | Facility: HOSPITAL | Age: 66
End: 2021-04-22

## 2021-05-06 ENCOUNTER — PATIENT MESSAGE (OUTPATIENT)
Dept: SURGERY | Facility: CLINIC | Age: 66
End: 2021-05-06

## 2021-05-06 ENCOUNTER — PATIENT MESSAGE (OUTPATIENT)
Dept: ENDOSCOPY | Facility: HOSPITAL | Age: 66
End: 2021-05-06

## 2021-05-27 ENCOUNTER — PATIENT MESSAGE (OUTPATIENT)
Dept: SURGERY | Facility: CLINIC | Age: 66
End: 2021-05-27

## 2021-06-01 ENCOUNTER — PATIENT MESSAGE (OUTPATIENT)
Dept: SURGERY | Facility: CLINIC | Age: 66
End: 2021-06-01

## 2021-06-16 ENCOUNTER — DOCUMENTATION ONLY (OUTPATIENT)
Dept: SURGERY | Facility: HOSPITAL | Age: 66
End: 2021-06-16

## 2021-06-28 ENCOUNTER — TELEPHONE (OUTPATIENT)
Dept: SURGERY | Facility: CLINIC | Age: 66
End: 2021-06-28

## 2021-08-30 ENCOUNTER — PATIENT MESSAGE (OUTPATIENT)
Dept: SURGERY | Facility: CLINIC | Age: 66
End: 2021-08-30
